# Patient Record
Sex: FEMALE | Race: WHITE | Employment: OTHER | URBAN - METROPOLITAN AREA
[De-identification: names, ages, dates, MRNs, and addresses within clinical notes are randomized per-mention and may not be internally consistent; named-entity substitution may affect disease eponyms.]

---

## 2017-04-19 ENCOUNTER — HOSPITAL ENCOUNTER (OUTPATIENT)
Dept: ULTRASOUND IMAGING | Age: 62
Discharge: HOME | End: 2017-04-19
Admitting: NURSE PRACTITIONER

## 2017-04-19 DIAGNOSIS — I85.00 ESOPHAGEAL VARICES WITHOUT MENTION OF BLEEDING: ICD-10-CM

## 2018-04-23 ENCOUNTER — HOSPITAL ENCOUNTER (OUTPATIENT)
Dept: ULTRASOUND IMAGING | Age: 63
Discharge: HOME | End: 2018-04-23
Admitting: INTERNAL MEDICINE

## 2018-04-23 DIAGNOSIS — K75.81 LIVER CIRRHOSIS SECONDARY TO NASH (HCC): ICD-10-CM

## 2018-04-23 DIAGNOSIS — K74.60 CIRRHOSIS OF LIVER WITHOUT ASCITES, UNSPECIFIED HEPATIC CIRRHOSIS TYPE (HCC): ICD-10-CM

## 2018-04-23 DIAGNOSIS — K74.60 LIVER CIRRHOSIS SECONDARY TO NASH (HCC): ICD-10-CM

## 2019-05-28 ENCOUNTER — HOSPITAL ENCOUNTER (OUTPATIENT)
Age: 64
Discharge: HOME | End: 2019-05-28
Payer: MEDICARE

## 2019-05-28 ENCOUNTER — HOSPITAL ENCOUNTER (OUTPATIENT)
Dept: CT IMAGING | Age: 64
Discharge: HOME | End: 2019-05-28
Payer: MEDICARE

## 2019-05-28 DIAGNOSIS — I67.1 CEREBRAL ANEURYSM, NONRUPTURED: ICD-10-CM

## 2019-05-28 LAB
BUN BLDV-MCNC: 17 MG/DL (ref 7–20)
CREAT SERPL-MCNC: 0.8 MG/DL (ref 0.6–1.2)
GFR AFRICAN AMERICAN: >60
GFR NON-AFRICAN AMERICAN: >60

## 2019-05-28 PROCEDURE — 6360000004 HC RX CONTRAST MEDICATION: Performed by: NEUROLOGICAL SURGERY

## 2019-05-28 PROCEDURE — 84520 ASSAY OF UREA NITROGEN: CPT

## 2019-05-28 PROCEDURE — 70496 CT ANGIOGRAPHY HEAD: CPT

## 2019-05-28 PROCEDURE — 70450 CT HEAD/BRAIN W/O DYE: CPT

## 2019-05-28 PROCEDURE — 82565 ASSAY OF CREATININE: CPT

## 2019-05-28 PROCEDURE — 36415 COLL VENOUS BLD VENIPUNCTURE: CPT

## 2019-05-28 RX ADMIN — IOPAMIDOL 75 ML: 755 INJECTION, SOLUTION INTRAVENOUS at 16:33

## 2019-05-29 ENCOUNTER — HOSPITAL ENCOUNTER (EMERGENCY)
Age: 64
Discharge: ANOTHER HOSPITAL | End: 2019-05-29
Attending: EMERGENCY MEDICINE
Payer: MEDICARE

## 2019-05-29 ENCOUNTER — HOSPITAL ENCOUNTER (INPATIENT)
Age: 64
LOS: 5 days | Discharge: HOME HEALTH | DRG: 159 | End: 2019-06-03
Attending: INTERNAL MEDICINE | Admitting: INTERNAL MEDICINE
Payer: MEDICARE

## 2019-05-29 ENCOUNTER — APPOINTMENT (OUTPATIENT)
Dept: CT IMAGING | Age: 64
End: 2019-05-29
Payer: MEDICARE

## 2019-05-29 ENCOUNTER — APPOINTMENT (OUTPATIENT)
Dept: GENERAL RADIOLOGY | Age: 64
End: 2019-05-29
Payer: MEDICARE

## 2019-05-29 VITALS
OXYGEN SATURATION: 100 % | WEIGHT: 183 LBS | TEMPERATURE: 97.8 F | HEART RATE: 74 BPM | SYSTOLIC BLOOD PRESSURE: 148 MMHG | DIASTOLIC BLOOD PRESSURE: 83 MMHG | RESPIRATION RATE: 17 BRPM | BODY MASS INDEX: 28.66 KG/M2

## 2019-05-29 DIAGNOSIS — K12.2 LUDWIG'S ANGINA: Primary | ICD-10-CM

## 2019-05-29 PROBLEM — R22.0 MOUTH SWELLING: Status: ACTIVE | Noted: 2019-05-29

## 2019-05-29 LAB
A/G RATIO: 1 (ref 1.1–2.2)
ALBUMIN SERPL-MCNC: 4.3 G/DL (ref 3.4–5)
ALP BLD-CCNC: 71 U/L (ref 40–129)
ALT SERPL-CCNC: 15 U/L (ref 10–40)
ANION GAP SERPL CALCULATED.3IONS-SCNC: 14 MMOL/L (ref 3–16)
AST SERPL-CCNC: 18 U/L (ref 15–37)
BASE EXCESS ARTERIAL: 1 (ref -3–3)
BASOPHILS ABSOLUTE: 0 K/UL (ref 0–0.2)
BASOPHILS RELATIVE PERCENT: 0.6 %
BILIRUB SERPL-MCNC: 0.3 MG/DL (ref 0–1)
BILIRUBIN URINE: NEGATIVE
BLOOD, URINE: NEGATIVE
BUN BLDV-MCNC: 21 MG/DL (ref 7–20)
CALCIUM SERPL-MCNC: 10.2 MG/DL (ref 8.3–10.6)
CHLORIDE BLD-SCNC: 103 MMOL/L (ref 99–110)
CLARITY: CLEAR
CO2: 24 MMOL/L (ref 21–32)
COLOR: YELLOW
CREAT SERPL-MCNC: 0.9 MG/DL (ref 0.6–1.2)
EOSINOPHILS ABSOLUTE: 0 K/UL (ref 0–0.6)
EOSINOPHILS RELATIVE PERCENT: 1 %
GFR AFRICAN AMERICAN: >60
GFR NON-AFRICAN AMERICAN: >60
GLOBULIN: 4.1 G/DL
GLUCOSE BLD-MCNC: 154 MG/DL (ref 70–99)
GLUCOSE URINE: NEGATIVE MG/DL
HCO3 ARTERIAL: 28.4 MMOL/L (ref 21–29)
HCT VFR BLD CALC: 34.7 % (ref 36–48)
HEMOGLOBIN: 11.4 G/DL (ref 12–16)
KETONES, URINE: NEGATIVE MG/DL
LEUKOCYTE ESTERASE, URINE: NEGATIVE
LYMPHOCYTES ABSOLUTE: 0.9 K/UL (ref 1–5.1)
LYMPHOCYTES RELATIVE PERCENT: 33.9 %
MCH RBC QN AUTO: 27.6 PG (ref 26–34)
MCHC RBC AUTO-ENTMCNC: 32.8 G/DL (ref 31–36)
MCV RBC AUTO: 84.2 FL (ref 80–100)
MICROSCOPIC EXAMINATION: NORMAL
MONO TEST: NEGATIVE
MONOCYTES ABSOLUTE: 0.2 K/UL (ref 0–1.3)
MONOCYTES RELATIVE PERCENT: 7.7 %
NEUTROPHILS ABSOLUTE: 1.5 K/UL (ref 1.7–7.7)
NEUTROPHILS RELATIVE PERCENT: 56.8 %
NITRITE, URINE: NEGATIVE
O2 SAT, ARTERIAL: 92 % (ref 93–100)
PCO2 ARTERIAL: 68 MM HG (ref 35–45)
PDW BLD-RTO: 15.3 % (ref 12.4–15.4)
PERFORMED ON: ABNORMAL
PH ARTERIAL: 7.23 (ref 7.35–7.45)
PH UA: 6 (ref 5–8)
PLATELET # BLD: 79 K/UL (ref 135–450)
PLATELET SLIDE REVIEW: ABNORMAL
PMV BLD AUTO: 7.4 FL (ref 5–10.5)
PO2 ARTERIAL: 78.8 MM HG (ref 75–108)
POC SAMPLE TYPE: ABNORMAL
POTASSIUM SERPL-SCNC: 4.1 MMOL/L (ref 3.5–5.1)
PROTEIN UA: NEGATIVE MG/DL
RBC # BLD: 4.12 M/UL (ref 4–5.2)
SEDIMENTATION RATE, ERYTHROCYTE: 94 MM/HR (ref 0–30)
SLIDE REVIEW: ABNORMAL
SODIUM BLD-SCNC: 141 MMOL/L (ref 136–145)
SPECIFIC GRAVITY UA: <=1.005 (ref 1–1.03)
TCO2 ARTERIAL: 31 MMOL/L
TOTAL PROTEIN: 8.4 G/DL (ref 6.4–8.2)
URINE REFLEX TO CULTURE: NORMAL
URINE TYPE: NORMAL
UROBILINOGEN, URINE: 0.2 E.U./DL
WBC # BLD: 2.7 K/UL (ref 4–11)

## 2019-05-29 PROCEDURE — 96368 THER/DIAG CONCURRENT INF: CPT

## 2019-05-29 PROCEDURE — 82803 BLOOD GASES ANY COMBINATION: CPT

## 2019-05-29 PROCEDURE — 96365 THER/PROPH/DIAG IV INF INIT: CPT

## 2019-05-29 PROCEDURE — 6360000002 HC RX W HCPCS: Performed by: STUDENT IN AN ORGANIZED HEALTH CARE EDUCATION/TRAINING PROGRAM

## 2019-05-29 PROCEDURE — 1200000000 HC SEMI PRIVATE

## 2019-05-29 PROCEDURE — 2580000003 HC RX 258: Performed by: EMERGENCY MEDICINE

## 2019-05-29 PROCEDURE — 94002 VENT MGMT INPAT INIT DAY: CPT

## 2019-05-29 PROCEDURE — 2500000003 HC RX 250 WO HCPCS: Performed by: EMERGENCY MEDICINE

## 2019-05-29 PROCEDURE — 2580000003 HC RX 258: Performed by: STUDENT IN AN ORGANIZED HEALTH CARE EDUCATION/TRAINING PROGRAM

## 2019-05-29 PROCEDURE — 6360000004 HC RX CONTRAST MEDICATION: Performed by: EMERGENCY MEDICINE

## 2019-05-29 PROCEDURE — 96372 THER/PROPH/DIAG INJ SC/IM: CPT

## 2019-05-29 PROCEDURE — 80053 COMPREHEN METABOLIC PANEL: CPT

## 2019-05-29 PROCEDURE — 85025 COMPLETE CBC W/AUTO DIFF WBC: CPT

## 2019-05-29 PROCEDURE — 86308 HETEROPHILE ANTIBODY SCREEN: CPT

## 2019-05-29 PROCEDURE — 86140 C-REACTIVE PROTEIN: CPT

## 2019-05-29 PROCEDURE — 70491 CT SOFT TISSUE NECK W/DYE: CPT

## 2019-05-29 PROCEDURE — 99285 EMERGENCY DEPT VISIT HI MDM: CPT

## 2019-05-29 PROCEDURE — 96361 HYDRATE IV INFUSION ADD-ON: CPT

## 2019-05-29 PROCEDURE — 96375 TX/PRO/DX INJ NEW DRUG ADDON: CPT

## 2019-05-29 PROCEDURE — 85652 RBC SED RATE AUTOMATED: CPT

## 2019-05-29 PROCEDURE — 2500000003 HC RX 250 WO HCPCS

## 2019-05-29 PROCEDURE — 6360000002 HC RX W HCPCS: Performed by: EMERGENCY MEDICINE

## 2019-05-29 PROCEDURE — 2000000000 HC ICU R&B

## 2019-05-29 PROCEDURE — 71045 X-RAY EXAM CHEST 1 VIEW: CPT

## 2019-05-29 PROCEDURE — 6360000002 HC RX W HCPCS

## 2019-05-29 PROCEDURE — 81003 URINALYSIS AUTO W/O SCOPE: CPT

## 2019-05-29 PROCEDURE — 96366 THER/PROPH/DIAG IV INF ADDON: CPT

## 2019-05-29 RX ORDER — METHYLPREDNISOLONE SODIUM SUCCINATE 125 MG/2ML
80 INJECTION, POWDER, LYOPHILIZED, FOR SOLUTION INTRAMUSCULAR; INTRAVENOUS DAILY
Status: DISCONTINUED | OUTPATIENT
Start: 2019-05-30 | End: 2019-06-03 | Stop reason: HOSPADM

## 2019-05-29 RX ORDER — DEXAMETHASONE SODIUM PHOSPHATE 10 MG/ML
4 INJECTION INTRAMUSCULAR; INTRAVENOUS ONCE
Status: COMPLETED | OUTPATIENT
Start: 2019-05-29 | End: 2019-05-29

## 2019-05-29 RX ORDER — PROPOFOL 10 MG/ML
10 INJECTION, EMULSION INTRAVENOUS CONTINUOUS
Status: DISCONTINUED | OUTPATIENT
Start: 2019-05-29 | End: 2019-05-31

## 2019-05-29 RX ORDER — VECURONIUM BROMIDE 1 MG/ML
10 INJECTION, POWDER, LYOPHILIZED, FOR SOLUTION INTRAVENOUS ONCE
Status: COMPLETED | OUTPATIENT
Start: 2019-05-29 | End: 2019-05-29

## 2019-05-29 RX ORDER — OMEPRAZOLE 20 MG/1
20 CAPSULE, DELAYED RELEASE ORAL DAILY
COMMUNITY

## 2019-05-29 RX ORDER — PROPOFOL 10 MG/ML
INJECTION, EMULSION INTRAVENOUS
Status: COMPLETED
Start: 2019-05-29 | End: 2019-05-29

## 2019-05-29 RX ORDER — 0.9 % SODIUM CHLORIDE 0.9 %
1000 INTRAVENOUS SOLUTION INTRAVENOUS ONCE
Status: COMPLETED | OUTPATIENT
Start: 2019-05-29 | End: 2019-05-29

## 2019-05-29 RX ORDER — PROPOFOL 10 MG/ML
10 INJECTION, EMULSION INTRAVENOUS
Status: DISCONTINUED | OUTPATIENT
Start: 2019-05-29 | End: 2019-05-29 | Stop reason: HOSPADM

## 2019-05-29 RX ORDER — ROCURONIUM BROMIDE 10 MG/ML
0.6 INJECTION, SOLUTION INTRAVENOUS ONCE
Status: DISCONTINUED | OUTPATIENT
Start: 2019-05-29 | End: 2019-05-29 | Stop reason: HOSPADM

## 2019-05-29 RX ORDER — CHLORHEXIDINE GLUCONATE 0.12 MG/ML
15 RINSE ORAL 2 TIMES DAILY
Status: DISCONTINUED | OUTPATIENT
Start: 2019-05-29 | End: 2019-05-31

## 2019-05-29 RX ORDER — PROPOFOL 10 MG/ML
60 INJECTION, EMULSION INTRAVENOUS ONCE
Status: COMPLETED | OUTPATIENT
Start: 2019-05-29 | End: 2019-05-29

## 2019-05-29 RX ORDER — ETOMIDATE 2 MG/ML
20 INJECTION INTRAVENOUS ONCE
Status: COMPLETED | OUTPATIENT
Start: 2019-05-29 | End: 2019-05-29

## 2019-05-29 RX ORDER — ETOMIDATE 2 MG/ML
0.3 INJECTION INTRAVENOUS ONCE
Status: COMPLETED | OUTPATIENT
Start: 2019-05-29 | End: 2019-05-29

## 2019-05-29 RX ORDER — SODIUM CHLORIDE 0.9 % (FLUSH) 0.9 %
10 SYRINGE (ML) INJECTION EVERY 12 HOURS SCHEDULED
Status: DISCONTINUED | OUTPATIENT
Start: 2019-05-29 | End: 2019-05-31

## 2019-05-29 RX ORDER — NICOTINE POLACRILEX 4 MG
15 LOZENGE BUCCAL PRN
Status: DISCONTINUED | OUTPATIENT
Start: 2019-05-29 | End: 2019-06-03 | Stop reason: HOSPADM

## 2019-05-29 RX ORDER — ACETAMINOPHEN 650 MG/1
650 SUPPOSITORY RECTAL EVERY 4 HOURS PRN
Status: DISCONTINUED | OUTPATIENT
Start: 2019-05-29 | End: 2019-06-03 | Stop reason: HOSPADM

## 2019-05-29 RX ORDER — GAUZE BANDAGE 4" X 4"
BANDAGE TOPICAL
COMMUNITY

## 2019-05-29 RX ORDER — DEXTROSE MONOHYDRATE 50 MG/ML
100 INJECTION, SOLUTION INTRAVENOUS PRN
Status: DISCONTINUED | OUTPATIENT
Start: 2019-05-29 | End: 2019-06-03 | Stop reason: HOSPADM

## 2019-05-29 RX ORDER — DEXTROSE MONOHYDRATE 25 G/50ML
12.5 INJECTION, SOLUTION INTRAVENOUS PRN
Status: DISCONTINUED | OUTPATIENT
Start: 2019-05-29 | End: 2019-06-03 | Stop reason: HOSPADM

## 2019-05-29 RX ORDER — DIPHENHYDRAMINE HYDROCHLORIDE 50 MG/ML
50 INJECTION INTRAMUSCULAR; INTRAVENOUS ONCE
Status: COMPLETED | OUTPATIENT
Start: 2019-05-29 | End: 2019-05-29

## 2019-05-29 RX ORDER — ONDANSETRON 2 MG/ML
4 INJECTION INTRAMUSCULAR; INTRAVENOUS EVERY 6 HOURS PRN
Status: DISCONTINUED | OUTPATIENT
Start: 2019-05-29 | End: 2019-06-03 | Stop reason: HOSPADM

## 2019-05-29 RX ORDER — SODIUM CHLORIDE 0.9 % (FLUSH) 0.9 %
10 SYRINGE (ML) INJECTION PRN
Status: DISCONTINUED | OUTPATIENT
Start: 2019-05-29 | End: 2019-05-31

## 2019-05-29 RX ADMIN — DEXAMETHASONE SODIUM PHOSPHATE 4 MG: 10 INJECTION, SOLUTION INTRAMUSCULAR; INTRAVENOUS at 15:12

## 2019-05-29 RX ADMIN — FENTANYL CITRATE 25 MCG/HR: 50 INJECTION, SOLUTION INTRAMUSCULAR; INTRAVENOUS at 23:52

## 2019-05-29 RX ADMIN — ETOMIDATE 25 MG: 2 INJECTION INTRAVENOUS at 17:19

## 2019-05-29 RX ADMIN — ETOMIDATE 20 MG: 2 INJECTION, SOLUTION INTRAVENOUS at 19:00

## 2019-05-29 RX ADMIN — FAMOTIDINE 20 MG: 10 INJECTION, SOLUTION INTRAVENOUS at 15:12

## 2019-05-29 RX ADMIN — AMPICILLIN SODIUM AND SULBACTAM SODIUM 3 G: 1; .5 INJECTION, POWDER, FOR SOLUTION INTRAMUSCULAR; INTRAVENOUS at 18:17

## 2019-05-29 RX ADMIN — DIPHENHYDRAMINE HYDROCHLORIDE 50 MG: 50 INJECTION, SOLUTION INTRAMUSCULAR; INTRAVENOUS at 16:03

## 2019-05-29 RX ADMIN — IOPAMIDOL 75 ML: 755 INJECTION, SOLUTION INTRAVENOUS at 16:17

## 2019-05-29 RX ADMIN — EPINEPHRINE 0.3 MG: 1 INJECTION INTRAMUSCULAR; INTRAVENOUS; SUBCUTANEOUS at 15:12

## 2019-05-29 RX ADMIN — SODIUM CHLORIDE 1000 ML: 9 INJECTION, SOLUTION INTRAVENOUS at 16:06

## 2019-05-29 RX ADMIN — PROPOFOL 60 MG: 10 INJECTION, EMULSION INTRAVENOUS at 18:50

## 2019-05-29 RX ADMIN — PROPOFOL 50 MCG/KG/MIN: 10 INJECTION, EMULSION INTRAVENOUS at 23:52

## 2019-05-29 RX ADMIN — VECURONIUM BROMIDE 10 MG: 5 INJECTION, POWDER, LYOPHILIZED, FOR SOLUTION INTRAVENOUS at 19:05

## 2019-05-29 RX ADMIN — PROPOFOL 10 MCG/KG/MIN: 10 INJECTION, EMULSION INTRAVENOUS at 17:59

## 2019-05-29 ASSESSMENT — PULMONARY FUNCTION TESTS
PIF_VALUE: 23
PIF_VALUE: 24
PIF_VALUE: 24
PIF_VALUE: 21
PIF_VALUE: 26
PIF_VALUE: 25
PIF_VALUE: 21
PIF_VALUE: 23

## 2019-05-29 NOTE — ED NOTES
Pt requesting to have her granddaughter Kalpana updated. Spoke with [facility] on the phone and gave update.      Torie Bazzi RN  05/29/19 9226

## 2019-05-29 NOTE — ED NOTES
Bed number: 0233-2  [facility]    Report number [phone]         Emy Stella  05/29/19 2200

## 2019-05-29 NOTE — ED NOTES
Pt. Medicated per mar and is aware of plan of care at this time. Pt. Provided w/oral suction to assist w/oral secretions. Remains on hr/rr monitoring w/bp cycling q10 mins. Will cont. To monitor.      Pedro Price RN  05/29/19 0010

## 2019-05-29 NOTE — ED NOTES
MD to bedside as pt. W/increased c/o \"feeling like my throat is closing\". MD ordered Malon Caryn and ct scan. Pt. Medicated per mar and taken to CT. Will cont. To monitor.      Nunu Ramon RN  05/29/19 0521

## 2019-05-29 NOTE — ED NOTES
Pt given 60 mg bolus propofol by Dr. Alanis Nathan, d/t pt fighting arm restraints. Mario Bateman, RN  05/29/19 1599

## 2019-05-29 NOTE — ED NOTES
Pt. Returned from CT. Provider to bedside for assessment. Pt. Replaced on hr/rr monitoring w/bp cycling q10 mins. Pt. Cont. To sx oral secretions and is alert and oriented at this time.      Ana Cheung RN  05/29/19 9931

## 2019-05-29 NOTE — ED NOTES
Pt. Received from triage stating she started a new nicotine patch yesterday and had a CT w/contrast for \"an anneurysm in my head\". States she noticed this morning that she had \"tongue swelling\" and was having difficulty swallowing her oral secretions. Pt. Brought to room 2 placed on hr/rr monitoring, attempting piv access at this time and MD Melendez to pt's bedside. MD declined need for ekg at this time. Will cont. To monitor.      Gonzalo Hope RN  05/29/19 6124

## 2019-05-29 NOTE — ED NOTES
MD notified pt. Requiring more sedation. Verbal orders given @ bedside for vecuronium and etomidate. Pt. Medicated and started 2nd piv d/t pt. Pulling piv to L hand. Propofol increased to 40mcg per md order @ bedside to achieve sufficient sedation. Will cont. To monitor.      Jaya Goncalves RN  05/29/19 6612

## 2019-05-29 NOTE — ED PROVIDER NOTES
Triage Chief Complaint:    Oral Swelling (Pt c/o tongue swelling that started about 2 hours ago. Pt reports that it is getting hard to breath)    [place]:  Sanam Clarke is a 58 y.o. female that presents to the emergency Department with complaints of having a swollen tongue. The patient states that this began approximately 2 hours prior to arrival.  The patient states that she had vomited, and sooner for vomiting, noticed a painful lump to the right side of her tongue. Patient states that that lump started to continue to swell, and the entire right side of her tongue is not swollen, and it is painful to move. Patient states she is having some difficulty swallowing. Patient is concerned about her airway. Patient states she is not on any ACE inhibitor medications. Patient has no allergies to any medications that she is aware of. Patient has not started any new medications. ROS:  At least 10 systems reviewed and otherwise acutely negative except as in the [address] Past Medical History:   Diagnosis Date    Arthritis     Diabetes     High blood pressure      Past Surgical History:   Procedure Laterality Date    KNEE SURGERY      left    LEG SURGERY      TUBAL LIGATION       No family history on file. Social History     Socioeconomic History    Marital status:       Spouse name: Not on file    Number of children: Not on file    Years of education: Not on file    Highest education level: Not on file   Occupational History    Not on file   Social Needs    Financial resource strain: Not on file    Food insecurity:     Worry: Not on file     Inability: Not on file    Transportation needs:     Medical: Not on file     Non-medical: Not on file   Tobacco Use    Smoking status: Never Smoker   Substance and Sexual Activity    Alcohol use: No    Drug use: No    Sexual activity: Not on file   Lifestyle    Physical activity:     Days per week: Not on file     Minutes per session: Not on file    Stress: Not on file   Relationships    Social connections:     Talks on phone: Not on file     Gets together: Not on file     Attends Hinduism service: Not on file     Active member of club or organization: Not on file     Attends meetings of clubs or organizations: Not on file     Relationship status: Not on file    Intimate partner violence:     Fear of current or ex partner: Not on file     Emotionally abused: Not on file     Physically abused: Not on file     Forced sexual activity: Not on file   Other Topics Concern    Not on file   Social History Narrative    Not on file     Current Facility-Administered Medications   Medication Dose Route Frequency Provider Last Rate Last Dose    rocuronium (ZEMURON) injection 50 mg  0.6 mg/kg Intravenous Once Janice Pratt MD        propofol 1000 MG/100ML injection             ampicillin-sulbactam (UNASYN) 3 g in sodium chloride 0.9 % 100 mL IVPB  3 g Intravenous Once Janice Pratt MD        propofol 1000 MG/100ML injection  10 mcg/kg/min Intravenous Titrated Janice Pratt MD         Current Outpatient Medications   Medication Sig Dispense Refill    omeprazole (PRILOSEC) 20 MG delayed release capsule Take 20 mg by mouth daily      IRON PO Take 65 mg by mouth      Cholecalciferol (VITAMIN D PO) Take 1.25 mg by mouth      Omega-3 Fatty Acids (FISH OIL) 435 MG CAPS Take by mouth      SITagliptin Phosphate (JANUVIA PO) Take 50 mg by mouth      buPROPion HCl (WELLBUTRIN PO) Take 300 mg by mouth      PARoxetine (PAXIL) 20 MG tablet       simvastatin (ZOCOR) 20 MG tablet       losartan-hydrochlorothiazide (HYZAAR) 100-25 MG per tablet       metformin (GLUCOPHAGE) 500 MG tablet 1,000 mg        Allergies   Allergen Reactions    Codeine     Morphine Nausea And Vomiting       Nursing Notes Reviewed    Physical Exam:  ED Triage Vitals   BP Temp Temp Source Pulse Resp SpO2 Height Weight   05/29/19 1517 05/29/19 1503 05/29/19 1503 05/29/19 1503 05/29/19 1503 05/29/19 1503 -- 05/29/19 1503   (!) 112/96 97.8 °F (36.6 °C) Temporal 83 17 95 %  183 lb (83 kg)     GENERAL APPEARANCE: Awake and alert. Cooperative. No acute distress. HEAD:Normocephalic. Atraumatic. EYES: EOM's grossly intact. Sclera anicteric. ENT: Mucous membranes are moist. Tolerates saliva. No trismus. Right sided lingular swelling. No uvular edema or throat swelling noted. No lip swelling. Normal dentition. No signs of Jluis's angina. NECK: Supple. No meningismus. Trachea midline. Mild fullness noted submandibularly on the right. No lymphadenopathy. HEART: RRR. LUNGS: Respirations unlabored. CTAB  ABDOMEN: Soft. Non-tender. No guarding or rebound. EXTREMITIES: No acute deformities. SKIN: Warm and dry. NEUROLOGICAL: No gross facial drooping. Moves all 4 extremities spontaneously.     Physical Exam    I have reviewed and interpreted all of the currently availablelab results from this visit (if applicable):  Results for orders placed or performed during the hospital encounter of 05/29/19   CBC Auto Differential   Result Value Ref Range    WBC 2.7 (L) 4.0 - 11.0 K/uL    RBC 4.12 4.00 - 5.20 M/uL    Hemoglobin 11.4 (L) 12.0 - 16.0 g/dL    Hematocrit 34.7 (L) 36.0 - 48.0 %    MCV 84.2 80.0 - 100.0 fL    MCH 27.6 26.0 - 34.0 pg    MCHC 32.8 31.0 - 36.0 g/dL    RDW 15.3 12.4 - 15.4 %    Platelets 79 (L) 581 - 450 K/uL    MPV 7.4 5.0 - 10.5 fL    PLATELET SLIDE REVIEW Decreased     SLIDE REVIEW see below     Neutrophils % 56.8 %    Lymphocytes % 33.9 %    Monocytes % 7.7 %    Eosinophils % 1.0 %    Basophils % 0.6 %    Neutrophils # 1.5 (L) 1.7 - 7.7 K/uL    Lymphocytes # 0.9 (L) 1.0 - 5.1 K/uL    Monocytes # 0.2 0.0 - 1.3 K/uL    Eosinophils # 0.0 0.0 - 0.6 K/uL    Basophils # 0.0 0.0 - 0.2 K/uL   Comprehensive Metabolic Panel   Result Value Ref Range    Sodium 141 136 - 145 mmol/L    Potassium 4.1 3.5 - 5.1 mmol/L    Chloride 103 99 - 110 mmol/L    CO2 24 21 - 32 mmol/L    Anion Gap 14 3 - 16 Glucose 154 (H) 70 - 99 mg/dL    BUN 21 (H) 7 - 20 mg/dL    CREATININE 0.9 0.6 - 1.2 mg/dL    GFR Non-African American >60 >60    GFR African American >60 >60    Calcium 10.2 8.3 - 10.6 mg/dL    Total Protein 8.4 (H) 6.4 - 8.2 g/dL    Alb 4.3 3.4 - 5.0 g/dL    Albumin/Globulin Ratio 1.0 (L) 1.1 - 2.2    Total Bilirubin 0.3 0.0 - 1.0 mg/dL    Alkaline Phosphatase 71 40 - 129 U/L    ALT 15 10 - 40 U/L    AST 18 15 - 37 U/L    Globulin 4.1 g/dL   Mononucleosis screen   Result Value Ref Range    Mono Test Negative Negative   Sedimentation Rate   Result Value Ref Range    Sed Rate 94 (H) 0 - 30 mm/Hr   Urinalysis Reflex to Culture   Result Value Ref Range    Color, UA Yellow Straw/Yellow    Clarity, UA Clear Clear    Glucose, Ur Negative Negative mg/dL    Bilirubin Urine Negative Negative    Ketones, Urine Negative Negative mg/dL    Specific Gravity, UA <=1.005 1.005 - 1.030    Blood, Urine Negative Negative    pH, UA 6.0 5.0 - 8.0    Protein, UA Negative Negative mg/dL    Urobilinogen, Urine 0.2 <2.0 E.U./dL    Nitrite, Urine Negative Negative    Leukocyte Esterase, Urine Negative Negative    Microscopic Examination Not Indicated     Urine Reflex to Culture Not Indicated     Urine Type Not Specified         Radiographs (if obtained):  [] The following radiograph was interpreted by myself in the absence of a radiologist:  [x] Radiologist's Report Reviewed:  CT SOFT TISSUE NECK W CONTRAST   Final Result   Addendum 1 of 1   ADDENDUM:   Critical results were called by Dr. Kenya Swift MD to [facility]   on 5/29/2019 at 16:52.          Final      XR CHEST PORTABLE    (Results Pending)         Intubation  Date/Time: 5/29/2019 5:40 PM  Performed by: Champ Land MD  Authorized by: Champ Land MD     Consent:     Consent obtained:  Verbal    Consent given by:  Patient    Risks discussed:  Aspiration, brain injury and death    Alternatives discussed:  Delayed treatment  Pre-procedure details: Patient status:  Awake    Mallampati score:  III    Pretreatment meds: etomidate. Paralytics:  Rocuronium  Procedure details:     Preoxygenation:  Bag valve mask    CPR in progress: no      Intubation method:  Oral    Oral intubation technique:  Fiber optic    Tube size (mm):  7.0    Tube type:  Cuffed    Number of attempts:  2    Ventilation between attempts: yes      Cricoid pressure: yes      Tube visualized through cords: yes    Placement assessment:     Tube secured with:  ETT hardy    Breath sounds:  Equal and absent over the epigastrium    Placement verification: chest rise, CXR verification, ETCO2 detector and tube exhalation      CXR findings:  ETT in proper place  Post-procedure details:     Patient tolerance of procedure: Tolerated well, no immediate complications  Comments:      After intubation was completed, I did give the patient an IV dose of the milligrams of propofol in order to help with sedation, and then she was started on a drip afterwards. MDM:  After my initial evaluation, I do feel the patient was likely suffering from some sort of allergic reaction. However the patient may have suffered an injury that caused her to have her tongue swell. The patient is on no new medications, and especially is not on any blood pressure medications that could cause problems. Patient was given Decadron, Pepcid, and subcutaneous epinephrine. The patient had continued swelling of the tongue, and then started to extend down to the right side of the patient's neck. The patient never became stridorous, but I did send the patient for CT scan of her neck and soft tissues which revealed significant submandibular and submental swelling, consistent with possible Jluis's angina. At this point I did feel that the patient was going to require intubation, especially when she came back from CT with submandibular swelling now on the left as well as the right.   The patient still was not stridorous and was able to speak. I did explain to the patient that I do feel that we need to protect her airway and she did consent to having intubation done. I did contact ENT at [facility], and spoke with Brisa Kline, who agreed that the patient required intubation and transfer to their ICU for observation. We did start Unasyn IV. Patient was intubated as outlined above. I spoke with the intensivist, Dr. Juliana Montes, and he agreed with admission. I then spoke with Dr. Yazan Robledo, and he also agreed with admission, and plans were made to arrange transport to [facility]. Critical care time provided of 33 minutes, excluding any previous dictated procedures. This time is being requested for physical examination, medical intervention, laboratory interpretation, frequent reassessments, physician consultation, and charting. Clinical Impression:  1.  Jluis's angina      (Please note that portions of this note Marshall Darline been completed with a voice recognition program. Efforts were made to edit the dictations but occasionally words are mis-transcribed.)    MD Janice Wayne MD  05/29/19 9622

## 2019-05-29 NOTE — PROGRESS NOTES
05/29/19 1747   Vent Information   Vent Type 840   Vent Mode AC/VC   Vt Ordered 400 mL   Rate Set 16 bmp   Peak Flow 60 L/min   Pressure Support 0 cmH20   FiO2  50 %   Sensitivity 3   PEEP/CPAP 5   Humidification Source Heated wire   Vent Patient Data   Peak Inspiratory Pressure 26 cmH2O   Mean Airway Pressure 8.4 cmH20   Rate Measured 16 br/min   Vt Exhaled 379 mL   Minute Volume 6.05 Liters   I:E Ratio 1:4.20   Spontaneous Breathing Trial (SBT) RT Doc   Pulse 80   SpO2 100 %   Additional Respiratory  Assessments   Resp (!) 0   Alarm Settings   High Pressure Alarm 40 cmH2O   Low Minute Volume Alarm 2.5 L/min   High Respiratory Rate 40 br/min   Patient Observation   Observations #7.0 ETT @ 25 lip line

## 2019-05-29 NOTE — ED NOTES
Verified with lab that adequate blood was in lab for add on specimens       Winter Coronel RN  05/29/19 3369

## 2019-05-30 PROBLEM — L08.9 NECK INFECTION: Status: ACTIVE | Noted: 2019-05-30

## 2019-05-30 LAB
ANION GAP SERPL CALCULATED.3IONS-SCNC: 13 MMOL/L (ref 3–16)
BASE EXCESS ARTERIAL: 1 (ref -3–3)
BASE EXCESS ARTERIAL: 2 (ref -3–3)
BASE EXCESS ARTERIAL: 2 (ref -3–3)
BASOPHILS ABSOLUTE: 0 K/UL (ref 0–0.2)
BASOPHILS RELATIVE PERCENT: 0.5 %
BUN BLDV-MCNC: 20 MG/DL (ref 7–20)
C-REACTIVE PROTEIN: 4.9 MG/L (ref 0–5.1)
C-REACTIVE PROTEIN: 6.1 MG/L (ref 0–5.1)
CALCIUM SERPL-MCNC: 9.5 MG/DL (ref 8.3–10.6)
CHLORIDE BLD-SCNC: 103 MMOL/L (ref 99–110)
CO2: 25 MMOL/L (ref 21–32)
CREAT SERPL-MCNC: 0.7 MG/DL (ref 0.6–1.2)
EOSINOPHILS ABSOLUTE: 0 K/UL (ref 0–0.6)
EOSINOPHILS RELATIVE PERCENT: 0.2 %
GFR AFRICAN AMERICAN: >60
GFR NON-AFRICAN AMERICAN: >60
GLUCOSE BLD-MCNC: 108 MG/DL (ref 70–99)
GLUCOSE BLD-MCNC: 111 MG/DL (ref 70–99)
GLUCOSE BLD-MCNC: 113 MG/DL (ref 70–99)
GLUCOSE BLD-MCNC: 154 MG/DL (ref 70–99)
GLUCOSE BLD-MCNC: 163 MG/DL (ref 70–99)
GLUCOSE BLD-MCNC: 206 MG/DL (ref 70–99)
GLUCOSE BLD-MCNC: 252 MG/DL (ref 70–99)
HCO3 ARTERIAL: 25.9 MMOL/L (ref 21–29)
HCO3 ARTERIAL: 26.5 MMOL/L (ref 21–29)
HCO3 ARTERIAL: 26.6 MMOL/L (ref 21–29)
HCT VFR BLD CALC: 32.7 % (ref 36–48)
HEMOGLOBIN: 10.5 G/DL (ref 12–16)
LYMPHOCYTES ABSOLUTE: 0.7 K/UL (ref 1–5.1)
LYMPHOCYTES RELATIVE PERCENT: 21.7 %
MCH RBC QN AUTO: 26.5 PG (ref 26–34)
MCHC RBC AUTO-ENTMCNC: 32.2 G/DL (ref 31–36)
MCV RBC AUTO: 82.3 FL (ref 80–100)
MONOCYTES ABSOLUTE: 0.4 K/UL (ref 0–1.3)
MONOCYTES RELATIVE PERCENT: 10.5 %
NEUTROPHILS ABSOLUTE: 2.3 K/UL (ref 1.7–7.7)
NEUTROPHILS RELATIVE PERCENT: 67.1 %
O2 SAT, ARTERIAL: 92 % (ref 93–100)
O2 SAT, ARTERIAL: 97 % (ref 93–100)
O2 SAT, ARTERIAL: 99 % (ref 93–100)
PCO2 ARTERIAL: 36 MM HG (ref 35–45)
PCO2 ARTERIAL: 42.8 MM HG (ref 35–45)
PCO2 ARTERIAL: 46.4 MM HG (ref 35–45)
PDW BLD-RTO: 15.7 % (ref 12.4–15.4)
PERFORMED ON: ABNORMAL
PH ARTERIAL: 7.37 (ref 7.35–7.45)
PH ARTERIAL: 7.4 (ref 7.35–7.45)
PH ARTERIAL: 7.46 (ref 7.35–7.45)
PLATELET # BLD: 88 K/UL (ref 135–450)
PMV BLD AUTO: 7.6 FL (ref 5–10.5)
PO2 ARTERIAL: 119.6 MM HG (ref 75–108)
PO2 ARTERIAL: 67 MM HG (ref 75–108)
PO2 ARTERIAL: 86.9 MM HG (ref 75–108)
POC SAMPLE TYPE: ABNORMAL
POTASSIUM REFLEX MAGNESIUM: 4.3 MMOL/L (ref 3.5–5.1)
RBC # BLD: 3.97 M/UL (ref 4–5.2)
SEDIMENTATION RATE, ERYTHROCYTE: 92 MM/HR (ref 0–30)
SODIUM BLD-SCNC: 141 MMOL/L (ref 136–145)
TCO2 ARTERIAL: 27 MMOL/L
TCO2 ARTERIAL: 28 MMOL/L
TCO2 ARTERIAL: 28 MMOL/L
WBC # BLD: 3.4 K/UL (ref 4–11)

## 2019-05-30 PROCEDURE — 6360000002 HC RX W HCPCS: Performed by: STUDENT IN AN ORGANIZED HEALTH CARE EDUCATION/TRAINING PROGRAM

## 2019-05-30 PROCEDURE — 85652 RBC SED RATE AUTOMATED: CPT

## 2019-05-30 PROCEDURE — 99223 1ST HOSP IP/OBS HIGH 75: CPT | Performed by: INTERNAL MEDICINE

## 2019-05-30 PROCEDURE — 2700000000 HC OXYGEN THERAPY PER DAY

## 2019-05-30 PROCEDURE — 86140 C-REACTIVE PROTEIN: CPT

## 2019-05-30 PROCEDURE — 99291 CRITICAL CARE FIRST HOUR: CPT | Performed by: INTERNAL MEDICINE

## 2019-05-30 PROCEDURE — 2580000003 HC RX 258

## 2019-05-30 PROCEDURE — 94770 HC ETCO2 MONITOR DAILY: CPT

## 2019-05-30 PROCEDURE — 82947 ASSAY GLUCOSE BLOOD QUANT: CPT

## 2019-05-30 PROCEDURE — 6370000000 HC RX 637 (ALT 250 FOR IP): Performed by: STUDENT IN AN ORGANIZED HEALTH CARE EDUCATION/TRAINING PROGRAM

## 2019-05-30 PROCEDURE — 2000000000 HC ICU R&B

## 2019-05-30 PROCEDURE — 87040 BLOOD CULTURE FOR BACTERIA: CPT

## 2019-05-30 PROCEDURE — 82803 BLOOD GASES ANY COMBINATION: CPT

## 2019-05-30 PROCEDURE — 94761 N-INVAS EAR/PLS OXIMETRY MLT: CPT

## 2019-05-30 PROCEDURE — 94750 HC PULMONARY COMPLIANCE STUDY: CPT

## 2019-05-30 PROCEDURE — 85025 COMPLETE CBC W/AUTO DIFF WBC: CPT

## 2019-05-30 PROCEDURE — 94003 VENT MGMT INPAT SUBQ DAY: CPT

## 2019-05-30 PROCEDURE — 36415 COLL VENOUS BLD VENIPUNCTURE: CPT

## 2019-05-30 PROCEDURE — 99221 1ST HOSP IP/OBS SF/LOW 40: CPT | Performed by: OTOLARYNGOLOGY

## 2019-05-30 PROCEDURE — 2580000003 HC RX 258: Performed by: STUDENT IN AN ORGANIZED HEALTH CARE EDUCATION/TRAINING PROGRAM

## 2019-05-30 PROCEDURE — 80048 BASIC METABOLIC PNL TOTAL CA: CPT

## 2019-05-30 RX ORDER — SIMVASTATIN 10 MG
10 TABLET ORAL NIGHTLY
Status: DISCONTINUED | OUTPATIENT
Start: 2019-05-30 | End: 2019-06-03 | Stop reason: HOSPADM

## 2019-05-30 RX ORDER — OMEPRAZOLE 20 MG/1
20 CAPSULE, DELAYED RELEASE ORAL DAILY
Status: DISCONTINUED | OUTPATIENT
Start: 2019-05-30 | End: 2019-06-03 | Stop reason: HOSPADM

## 2019-05-30 RX ORDER — PAROXETINE HYDROCHLORIDE 20 MG/1
30 TABLET, FILM COATED ORAL DAILY
Status: DISCONTINUED | OUTPATIENT
Start: 2019-05-30 | End: 2019-06-03 | Stop reason: HOSPADM

## 2019-05-30 RX ORDER — BUPROPION HYDROCHLORIDE 75 MG/1
300 TABLET ORAL DAILY
Status: DISCONTINUED | OUTPATIENT
Start: 2019-05-30 | End: 2019-06-03 | Stop reason: HOSPADM

## 2019-05-30 RX ORDER — SODIUM CHLORIDE 9 MG/ML
INJECTION, SOLUTION INTRAVENOUS
Status: COMPLETED
Start: 2019-05-30 | End: 2019-05-30

## 2019-05-30 RX ADMIN — PROPOFOL 45 MCG/KG/MIN: 10 INJECTION, EMULSION INTRAVENOUS at 08:42

## 2019-05-30 RX ADMIN — SODIUM CHLORIDE: 9 INJECTION, SOLUTION INTRAVENOUS at 03:11

## 2019-05-30 RX ADMIN — Medication 15 ML: at 00:34

## 2019-05-30 RX ADMIN — AMPICILLIN SODIUM AND SULBACTAM SODIUM 3 G: 2; 1 INJECTION, POWDER, FOR SOLUTION INTRAMUSCULAR; INTRAVENOUS at 17:43

## 2019-05-30 RX ADMIN — AMPICILLIN SODIUM AND SULBACTAM SODIUM 3 G: 2; 1 INJECTION, POWDER, FOR SOLUTION INTRAMUSCULAR; INTRAVENOUS at 06:13

## 2019-05-30 RX ADMIN — INSULIN LISPRO 1 UNITS: 100 INJECTION, SOLUTION INTRAVENOUS; SUBCUTANEOUS at 00:32

## 2019-05-30 RX ADMIN — AMPICILLIN SODIUM AND SULBACTAM SODIUM 3 G: 2; 1 INJECTION, POWDER, FOR SOLUTION INTRAMUSCULAR; INTRAVENOUS at 00:34

## 2019-05-30 RX ADMIN — SIMVASTATIN 10 MG: 10 TABLET, FILM COATED ORAL at 20:08

## 2019-05-30 RX ADMIN — Medication 10 ML: at 08:35

## 2019-05-30 RX ADMIN — LINAGLIPTIN 5 MG: 5 TABLET, FILM COATED ORAL at 16:59

## 2019-05-30 RX ADMIN — METHYLPREDNISOLONE SODIUM SUCCINATE 80 MG: 125 INJECTION, POWDER, FOR SOLUTION INTRAMUSCULAR; INTRAVENOUS at 08:34

## 2019-05-30 RX ADMIN — ENOXAPARIN SODIUM 40 MG: 40 INJECTION SUBCUTANEOUS at 08:35

## 2019-05-30 RX ADMIN — Medication 10 ML: at 00:34

## 2019-05-30 RX ADMIN — AMPICILLIN SODIUM AND SULBACTAM SODIUM 3 G: 2; 1 INJECTION, POWDER, FOR SOLUTION INTRAMUSCULAR; INTRAVENOUS at 23:40

## 2019-05-30 RX ADMIN — OMEPRAZOLE 20 MG: 20 CAPSULE, DELAYED RELEASE ORAL at 16:58

## 2019-05-30 RX ADMIN — PROPOFOL 45 MCG/KG/MIN: 10 INJECTION, EMULSION INTRAVENOUS at 04:05

## 2019-05-30 RX ADMIN — AMPICILLIN SODIUM AND SULBACTAM SODIUM 3 G: 2; 1 INJECTION, POWDER, FOR SOLUTION INTRAMUSCULAR; INTRAVENOUS at 12:33

## 2019-05-30 RX ADMIN — PAROXETINE HYDROCHLORIDE 30 MG: 20 TABLET, FILM COATED ORAL at 17:00

## 2019-05-30 RX ADMIN — Medication 10 ML: at 20:13

## 2019-05-30 RX ADMIN — BUPROPION HYDROCHLORIDE 300 MG: 75 TABLET, FILM COATED ORAL at 17:00

## 2019-05-30 RX ADMIN — Medication 15 ML: at 08:18

## 2019-05-30 RX ADMIN — INSULIN LISPRO 1 UNITS: 100 INJECTION, SOLUTION INTRAVENOUS; SUBCUTANEOUS at 20:10

## 2019-05-30 RX ADMIN — INSULIN LISPRO 3 UNITS: 100 INJECTION, SOLUTION INTRAVENOUS; SUBCUTANEOUS at 18:08

## 2019-05-30 RX ADMIN — INSULIN LISPRO 1 UNITS: 100 INJECTION, SOLUTION INTRAVENOUS; SUBCUTANEOUS at 12:36

## 2019-05-30 RX ADMIN — Medication 15 ML: at 20:08

## 2019-05-30 ASSESSMENT — PULMONARY FUNCTION TESTS
PIF_VALUE: 22
PIF_VALUE: 22
PIF_VALUE: 23
PIF_VALUE: 21
PIF_VALUE: 13
PIF_VALUE: 21
PIF_VALUE: 18
PIF_VALUE: 18
PIF_VALUE: 20
PIF_VALUE: 23
PIF_VALUE: 22
PIF_VALUE: 21
PIF_VALUE: 22
PIF_VALUE: 20
PIF_VALUE: 20
PIF_VALUE: 21
PIF_VALUE: 24
PIF_VALUE: 18
PIF_VALUE: 20
PIF_VALUE: 18
PIF_VALUE: 23
PIF_VALUE: 20

## 2019-05-30 ASSESSMENT — PAIN SCALES - GENERAL
PAINLEVEL_OUTOF10: 0

## 2019-05-30 NOTE — PROGRESS NOTES
Admission ABG as follows:   Ref. Range 5/29/2019 22:18   pH, Arterial Latest Ref Range: 7.350 - 7.450  7.229 (L)   pCO2, Arterial Latest Ref Range: 35.0 - 45.0 mm Hg 68.0 (H)   pO2, Arterial Latest Ref Range: 75.0 - 108.0 mm Hg 78.8   HCO3, Arterial Latest Ref Range: 21.0 - 29.0 mmol/L 28.4   TCO2 (calc), Art Latest Ref Range: Not Established mmol/L 31   Base Excess, Arterial Latest Ref Range: -3 - 3  1   O2 Sat, Arterial Latest Ref Range: 93 - 100 % 92 (L)   Sample Type Unknown ART   Patients respiratory rate increased from 16 to 20 per MD orders at this time. Will draw a follow up ABG in an hour. Will continue to monitor.

## 2019-05-30 NOTE — PROGRESS NOTES
Patient has been successfully weaned from Mechanical Ventilation. RSBI before extubation was 40 with EtCO2 of 32 and SpO2 of 99 on 40% FiO2. Patient extubated and placed on 4 liters/min via nasal cannula. Post extubation SpO2 is 96% with HR  84 bpm and RR 19 breaths/min. Patient had strong cough that was non-productive. Extubation Well tolerated by patient. Leticia Penny

## 2019-05-30 NOTE — CONSULTS
ICU Progress Note        PGY1    Hospital Day: 2                                                         Code:Full Code  Admit Date: 5/29/2019                                 PCP: MARSHA Ramsey CNP    ICU Day: 2  Vent Day:1   Antibiotics: Unasyn Indication: Sea Angina  Duration: 1    Diet: DIET CARB CONTROL;    Daily Plan:  5/30/2019     HPI:    59 yo F with PMHx NIDDM, HTN, and R MCA saccular aneurysm who was experiencing maxillary facial pain in March which was later attributed to tooth abscess which was treated with root canal of L cuspid tooth by a dentist in [facility] in April (records not available in [address] [place] [address], pt will attempt to find her dentist's number as she does not recall exact date and details of procedure). Yesterday she experienced sudden-onset of R-sided tongue swelling, following by an acute episode of violent vomiting at her grand-daughter's house. She ate some nuts and grapes prior to episode, of which she is unaware of any allergy. Last change in medication was Hyzaar to candesartan in March due to Hyzaar recall. She denies any recent sore throat, dysphagia, cough, sick contacts or allergic reaction. She denies any fevers, chills night sweats, nausea or vomiting at present. She has been off antibiotics for about a month. She went for a CTA at [facility] on day prior to episode for follow-up of her berry aneurysm. She drove herself to [facility] yesterday where CT soft tissue neck identified right submandibular periglandular fluid with extension into the submental and sublingual compartments, consistent with Jluis's angina. She was started on Unasyn and steroids, intubated for airway protection and transferred to [facility] for ENT evaluation. Suspect source of submandibular infection is thought to be recent tooth abscess. She was extubated today 5/30 with no acute complications.      Subjective:     Pt was phonating during examination this morning while intubated. Passed leak test and was successfully extubated this afternoon. She endorses minimal throat pain post extubation and reports significant decrease in submandibular swelling. She denies any current pain and reports good appetite. She still has some R-sided submandibular swelling.      Medications:     Scheduled Meds:   buPROPion  300 mg Oral Daily    omeprazole  20 mg Oral Daily    PARoxetine  30 mg Oral Daily    simvastatin  10 mg Oral Nightly    linagliptin  5 mg Oral Daily    sodium chloride flush  10 mL Intravenous 2 times per day    enoxaparin  40 mg Subcutaneous Daily    chlorhexidine  15 mL Mouth/Throat BID    ampicillin-sulbactam  3 g Intravenous Q6H    insulin lispro  0-6 Units Subcutaneous TID WC    insulin lispro  0-3 Units Subcutaneous Nightly    methylPREDNISolone  80 mg Intravenous Daily     Continuous Infusions:   fentaNYL 2000 mcg/200 mL IV infusion Stopped (19 1210)    propofol Stopped (19 1132)    dextrose       PRN Meds:benzocaine-menthol, sodium chloride flush, magnesium hydroxide, ondansetron, acetaminophen, glucose, dextrose, glucagon (rDNA), dextrose    Objective:   Vitals  T-max: Temp (24hrs), Av °F (36.7 °C), Min:97.7 °F (36.5 °C), Max:98.4 °F (36.9 °C)    Patient Vitals for the past 8 hrs:   BP Temp Temp src Pulse Resp SpO2   19 1400 -- -- -- 82 14 96 %   19 1345 -- -- -- 82 16 97 %   19 1315 -- -- -- 83 16 94 %   19 1300 121/66 -- -- 80 15 94 %   19 1245 -- -- -- 85 14 95 %   19 1230 122/61 -- -- 82 16 97 %   19 1215 -- -- -- 94 21 100 %   19 1200 131/65 -- -- 86 25 97 %   19 1145 -- -- -- 80 16 100 %   19 1131 -- -- -- 72 12 99 %   19 1130 123/63 -- -- 77 17 96 %   19 1122 -- -- -- 72 21 100 %   19 1115 -- -- -- 73 18 99 %   19 1100 (!) 89/48 -- -- 68 17 98 %   19 1045 -- -- -- 69 17 98 %   19 1030 (!) 90/51 -- -- 69 17 99 %   05/30/19 1015 -- -- -- 68 17 99 %   05/30/19 1000 (!) 102/56 -- -- 70 17 100 %   05/30/19 0945 -- -- -- 71 17 99 %   05/30/19 0930 (!) 109/55 -- -- 72 17 100 %   05/30/19 0915 -- -- -- 75 17 99 %   05/30/19 0900 109/61 -- -- 75 17 99 %   05/30/19 0845 -- -- -- 77 18 99 %   05/30/19 0838 126/70 98.4 °F (36.9 °C) Axillary -- -- --   05/30/19 0831 126/70 -- -- 78 17 100 %   05/30/19 0830 -- -- -- 73 14 100 %   05/30/19 0815 -- -- -- 74 17 100 %   05/30/19 0811 -- -- -- 75 17 100 %   05/30/19 0801 112/62 -- -- 74 17 100 %   05/30/19 0800 -- -- -- 75 17 100 %   05/30/19 0745 -- -- -- 73 17 100 %   05/30/19 0730 113/63 -- -- 74 17 100 %   05/30/19 0715 -- -- -- 74 17 99 %   05/30/19 0700 101/60 -- -- 74 17 99 %       Intake/Output Summary (Last 24 hours) at 5/30/2019 1441  Last data filed at 5/30/2019 1321  Gross per 24 hour   Intake 377.69 ml   Output 1175 ml   Net -797.31 ml       Physical Examination:   · General appearance: Appears comfortable at rest, fully alert and orientated    · HEENT: Atraumatic, moist mucus membranes, obese neck, Swollen, non-tender R submandibular gland, no palpable crepitus of soft neck tissues, no trismus. Normal gingiva, no tenderness to palpation of sinuses, or gingival, no tonsillar exudate. No pain upon rotation of neck or with swallowing  · Respiratory: Normal respiratory effort. No wheezes, rubs, or crackles  · Cardiovascular: regular S1/S2, with no Murmer, rub or gallop. No JVD  · Abdomen: Soft, non-tender, non-distended  · Musculoskeletal: No clubbing, cyanosis, no lower extremity edema, peripheral pulses present, cap refill < 2sec  · Neurologic: Neurovascularly grossly intact without any focal motor deficits.  Cranial nerves:  grossly non-focal.    LABS    CBC:   Recent Labs     05/29/19  1513 05/30/19  0602   WBC 2.7* 3.4*   HGB 11.4* 10.5*   HCT 34.7* 32.7*   PLT 79* 88*   MCV 84.2 82.3     Renal:   Recent Labs     05/28/19  1535 05/29/19  1513 05/30/19  0602   NA  --  141 141   K  --  4.1 4.3   CL  --  103 103   CO2  --  24 25   BUN 17 21* 20   CREATININE 0.8 0.9 0.7   GLUCOSE  --  154* 113*   CALCIUM  --  10.2 9.5   ANIONGAP  --  14 13     Hepatic:   Recent Labs     05/29/19  1513   AST 18   ALT 15   BILITOT 0.3   PROT 8.4*   LABALBU 4.3   ALKPHOS 71     Troponin: No results for input(s): TROPONINI in the last 72 hours. BNP: No results for input(s): BNP in the last 72 hours. Lipids: No results for input(s): CHOL, HDL in the last 72 hours. Invalid input(s): LDLCALCU, TRIGLYCERIDE  ABGs:    Recent Labs     05/30/19  0017 05/30/19  0400 05/30/19  0610   PHART 7.400 7.465* 7.367   CZO0ICA 42.8 36.0 46.4*   PO2ART 119.6* 86.9 67.0*   RHC0TOS 26.5 25.9 26.6   BEART 2 2 1   G8RDYRWW 99 97 92*   YIF5CMY 28 27 28       INR: No results for input(s): INR in the last 72 hours. Lactate: No results for input(s): LACTATE in the last 72 hours. Cultures:  Blood cultures pending  -----------------------------------------------------------------  Imaging:  CT soft tissue neck (5/29): Right submandibular periglandular fluid, also interspersed in the submental   in sublingual compartments.  Jluis's angina/deep cellulitis is a primary   concern.  Submandibular sialoadenitis is another consideration.              Assessment/Plan:   Scout Mittal is a 58 y.o. female with PMH of DMII and HTN transferred from [facility] for ENT evaluation for Jluis's angina.     Right submandibular space swelling 2/2 suspected Jluis's angina (periodontal infection as suspected source)   -patient reported she had root canal of L upper cuspid for tooth abscess one month ago (records not available in [place], pt will attempt to find records from dentist)  -extubated 5/30  -CT scan of the head and neck showing right submandibular periglandular fluid with extension into the submental and sublingual compartments, consistent wth infectious and/or allergic etiology   -appreciate ENT recs   -Ampicillin-sulbactam 3g IV q6h, continue abx therapy for 2-3 weeks, appreciate ID recs  -Methylprednisolone 80 mg q daily, resumed home omeprazole  -CRP not elevated, afebrile, less concerning for systemic infection   -Blood cultures x 2 pending   -peridex mouthwash  -appreciate ID recs      DM2   -resume home Januvia  -resume metformin 1000 mg PO BID starting tomorrow (awaiting 48hrs post contrast)   -Low-dose sliding scale insulin   -Hypoglycemia protocol      HTN   -resume home candesartan 4 mh PO qD tomorrow as BP allows (was switched from Hyzaar to candesartan in March due to Hyzaar recall      HLD  -resume home simvastatin 20 mg while NPO     Anxiety  -resume home bupropion, paroxetine    R MCA saccular aneurysm   -last CTA head performed day prior to admission (5/28), R MCA bifurcation aneurysm stable at 5mm    -follows with Dr. Sarahi Lopez, neurology     Code Status:Full Code  FEN: Carb control diet  PPX: Lovenox 40  DISPO: ICU     W/D/W/A  -----------------------------  Jaya Carl, PGY1  3/46/5158  2:41 PM     Patient seen, examined and discussed with the resident and I agree with the assessment and plan. Briefly, this is a 58 y.o. female with respiratory failure 2/2 Jluis's angina    Decreased swelling on exam per report and ENT evaluated her and felt her airway was safe enough to remove from the vent. Allowed patient to wake up and performed a cuff leak test with adequate audible air escape. Patient extubated successfully this morning and she said the swelling in her tongue was improved and that she could speak again which was not the case when she was intubated. Continue unasyn. ENT may take a second look now that the ETT has been removed. Critical care time spent reviewing labs/films, examining patient, collaborating with other physicians but excluding procedures for life threatening organ failure is 35 minutes.       Norris Cannon MD

## 2019-05-30 NOTE — CONSULTS
Substance and Sexual Activity   Alcohol Use No       Current Facility-Administered Medications:     sodium chloride flush 0.9 % injection 10 mL, 10 mL, Intravenous, 2 times per day, Jaya Williamson MD, 10 mL at 05/30/19 0835    sodium chloride flush 0.9 % injection 10 mL, 10 mL, Intravenous, PRN, Jaya Williamson MD    magnesium hydroxide (MILK OF MAGNESIA) 400 MG/5ML suspension 30 mL, 30 mL, Oral, Daily PRN, Jaya Williamson MD    ondansetron [facility]) injection 4 mg, 4 mg, Intravenous, Q6H PRN, Jaya Williamson MD    enoxaparin (LOVENOX) injection 40 mg, 40 mg, Subcutaneous, Daily, Jaya Williamson MD, 40 mg at 05/30/19 0835    acetaminophen (TYLENOL) suppository 650 mg, 650 mg, Rectal, Q4H PRN, Jaya Williamson MD    chlorhexidine (PERIDEX) 0.12 % solution 15 mL, 15 mL, Mouth/Throat, BID, Jaya Williamson MD, 15 mL at 05/30/19 0818    fentaNYL (SUBLIMAZE) 2,000 mcg in dextrose 5 % 200 mL, 25 mcg/hr, Intravenous, Continuous, Jaya Williamson MD, Last Rate: 2.5 mL/hr at 05/29/19 2352, 25 mcg/hr at 05/29/19 2352    propofol 1000 MG/100ML injection, 10 mcg/kg/min (Order-Specific), Intravenous, Continuous, Jaya Williamson MD, Last Rate: 22.4 mL/hr at 05/30/19 0842, 45 mcg/kg/min at 05/30/19 0842    ampicillin-sulbactam (UNASYN) 3 g ivpb minibag, 3 g, Intravenous, Q6H, Jaya Williamson MD, Stopped at 05/30/19 0643    insulin lispro (HUMALOG) injection pen 0-6 Units, 0-6 Units, Subcutaneous, TID WC, Serge Molina MD    insulin lispro (HUMALOG) injection pen 0-3 Units, 0-3 Units, Subcutaneous, Nightly, Serge Molina MD, 1 Units at 05/30/19 0032    glucose (GLUTOSE) 40 % oral gel 15 g, 15 g, Oral, PRN, Serge Molina MD    dextrose 50 % solution 12.5 g, 12.5 g, Intravenous, PRN, Serge Molina MD    glucagon (rDNA) injection 1 mg, 1 mg, Intramuscular, PRN, Serge Molina MD    dextrose 5 % solution, 100 mL/hr, Intravenous, PRN, Serge Molina MD    methylPREDNISolone sodium (SOLU-MEDROL) injection 80 mg, 80 mg, Intravenous, Daily, Serge Molina MD, 80 mg at 05/30/19 3092  Past Medical History:   Diagnosis Date    Arthritis     Diabetes ([address]     High blood pressure      Past Surgical History:   Procedure Laterality Date    KNEE SURGERY      left    LEG SURGERY      TUBAL LIGATION         FAMILY HISTORY:  Reviewed. No significant family history. REVIEW OF SYSTEMS: All pertinent positive and negative findings have been reviewed in HPI. Otherwise, all all systems are reviewed and are negative. PHYSICAL EXAM:    VITALS:  /70   Pulse 75   Temp 98.4 °F (36.9 °C) (Axillary)   Resp 17   Ht 5' 4\" (1.626 m)   Wt 181 lb 3.5 oz (82.2 kg)   SpO2 100%   BMI 31.11 kg/m²   WELL DEVELOPED WELL NOURISHED. NO PERIPHERAL EDEMA. NO ACUTE RESPIRATORY DISTRESS. INTUBATED  ORIENTED X 3.  VOICE: Intubated  HEARING BY CONFRONTATION IS NORMAL. HEAD AND FACE: Normal  EXTERNAL EARS AND NOSE : Normal  EXTRAOCULAR MUSCLES:  Intact  FACIAL MUSCLES: Normal strength  FACE PALPATION: Normal  OTOSCOPY: Normal tympanic membranes and middle ears  INTRANASAL: Septum midline. meati clear. Turbinates normal.  LIPS, TEETH, GINGIVA: Normal  BUCCAL MUCOSA: Normal  PHARYNX: Normal.  No swelling of floor of mouth  NECK: Soft edema of right submandibular space. SALIVARY GLANDS: Normal  THYROID: Normal  NASOPHARYNX, HYPOPHARYNX, LARYNX: No indication for examination based on symptoms. CT IMAGES REVIEWED:  No abscess formation  IMPRESSION: No indicatio for ENT intervention  RECOMMENDATIONS: Will be available for further consultation if needed.

## 2019-05-30 NOTE — PROGRESS NOTES
Morning repeat ABG results given to MD. No changes to vent settings at this time. Will order a repeat ABG for 2 hours from now. Will continue to monitor.

## 2019-05-30 NOTE — PROGRESS NOTES
Hospitalist ICU Progress Note    Date of Admission: 5/29/2019    Chief Complaint: neck swelling    Hospital Course: Pt admitted to ICU for angioedema     Subjective: pt doing well post extubation     Exam:    /61   Pulse 82   Temp 98.4 °F (36.9 °C) (Axillary)   Resp 16   Ht 5' 4\" (1.626 m)   Wt 181 lb 3.5 oz (82.2 kg)   SpO2 97%   BMI 31.11 kg/m²     General appearance: No apparent distress, pt appears ill. HEENT: Pupils equal, round, and reactive to light. Conjunctivae/corneas clear. Neck: Supple, with full range of motion. No jugular venous distention. Trachea midline. Respiratory:  Clear to auscultation, bilaterally without RALES/WHEEZES/Rhonchi. Cardiovascular: Regular rate and rhythm with normal S1/S2 without MURMURS, rubs or gallops. Abdomen: Soft, non-tender, non-distended with normal bowel sounds. Musculoskeletal: No clubbing, cyanosis or EDEMA bilaterally. Full range of motion without deformity. Skin: Skin color, texture, turgor normal.  No rashes or lesions. Neurologic:  Neurovascularly intact without any focal sensory/motor deficits. Cranial nerves: II-XII intact, grossly non-focal.  Psychiatric: Alert and oriented, thought content appropriate, normal insight  Capillary Refill: Brisk,< 3 seconds   Peripheral Pulses: +2 palpable, equal bilaterally     Assessment/Plan:    Acute Medical Issues Being Addressed:  Angioedema- pt extubated successfully today- ENT to see- ID consulted. Resolved Medical Issues during this hospital stay:  n/a    Chronic Stable Medical Issue --> cont. home regimen as appropriate or otherwise noted:  DM2  HTN  HLD  obesity    Code Status: Full Code    Hospitalist will assume care once patient is ready for transfer to general wards.     Vini Jackson MD

## 2019-05-30 NOTE — CONSULTS
MAGNESIA) 400 MG/5ML suspension 30 mL Daily PRN   ondansetron (ZOFRAN) injection 4 mg Q6H PRN   enoxaparin (LOVENOX) injection 40 mg Daily   acetaminophen (TYLENOL) suppository 650 mg Q4H PRN   chlorhexidine (PERIDEX) 0.12 % solution 15 mL BID   fentaNYL (SUBLIMAZE) 2,000 mcg in dextrose 5 % 200 mL Continuous   propofol 1000 MG/100ML injection Continuous   ampicillin-sulbactam (UNASYN) 3 g ivpb minibag Q6H   insulin lispro (HUMALOG) injection pen 0-6 Units TID WC   insulin lispro (HUMALOG) injection pen 0-3 Units Nightly   glucose (GLUTOSE) 40 % oral gel 15 g PRN   dextrose 50 % solution 12.5 g PRN   glucagon (rDNA) injection 1 mg PRN   dextrose 5 % solution PRN   methylPREDNISolone sodium (SOLU-MEDROL) injection 80 mg Daily       Family History:   No family history on file. Social History:   TOBACCO:   reports that she has never smoked. She does not have any smokeless tobacco history on file. ETOH:   reports that she does not drink alcohol. DRUGS:   reports that she does not use drugs. ROS: A 14 point review of systems was conducted, significant findings as noted in HPI. All other systems negative. Physical exam:    Vitals:    05/30/19 0200 05/30/19 0230 05/30/19 0300 05/30/19 0437   BP: (!) 96/57 (!) 95/58 (!) 102/58    Pulse: 77 75 76 76   Resp: 20 20 20 17   Temp:       TempSrc:       SpO2: 98% 99% 99% 98%       General appearance: alert, no acute distress, grooming appropriate  HEENT: PERRLA, no scleral icterus. Trachea midline, extensive soft tissue edema noted diffusely.  ET tube in place   Chest/Lungs: CTAB, ventilated  Cardiovascular: RRR, no murmurs/gallops/rubs  Abdomen: soft, non-tender, non-distended, +BS, no guarding/rigidity  Skin: warm and dry, no rashes  Extremities: no edema, no cyanosis  Neuro: A&Ox3, no focal deficits, sensation intact    Labs:    CBC: Recent Labs     05/29/19  1513   WBC 2.7*   HGB 11.4*   HCT 34.7*   MCV 84.2   PLT 79*     BMP: Recent Labs     05/28/19  9979 05/29/19  1513   NA  --  141   K  --  4.1   CL  --  103   CO2  --  24   BUN 17 21*   CREATININE 0.8 0.9     PT/INR: No results for input(s): PROTIME, INR in the last 72 hours. APTT: No results for input(s): APTT in the last 72 hours. Liver Profile:  Lab Results   Component Value Date    AST 18 05/29/2019    ALT 15 05/29/2019    BILITOT 0.3 05/29/2019    ALKPHOS 71 05/29/2019   No results found for: CHOL, HDL, TRIG  UA: Lab Results   Component Value Date    COLORU Yellow 05/29/2019    PHUR 6.0 05/29/2019    CLARITYU Clear 05/29/2019    SPECGRAV <=1.005 05/29/2019    LEUKOCYTESUR Negative 05/29/2019    UROBILINOGEN 0.2 05/29/2019    BILIRUBINUR Negative 05/29/2019    BLOODU Negative 05/29/2019    GLUCOSEU Negative 05/29/2019       Imaging:   No orders to display          Assessment/Plan: This is a 58 y.o. female with Hx of with Hx of DM and HTN was transferred from OSH to with swollen tongue and compromised airway requiring intubation and sedation. CT scan concerning for Jluis angina. Continue treatment with abx and steroid to decrease swelling. Needle aspiration vs I and D may be indicated if patient not responding to abx treatment. Leak test to assess patient readiness for extubation. May need laryngoscopy for further examination    Discussed with senior resident, will discuss with attending.         Carolina Kruse MD  PGY-1 General Surgery  05/30/19  5:35 AM  [phone]

## 2019-05-30 NOTE — ED NOTES
Pt. Remains comfortably sedated on vent w/vss at this time. piv to R hand and R ac appear wnl. Nsg report given to Leobardo Avila RN @ [facility]. Patient consented verbally to transfer prior to intubation.      Dois Hatchet, RN  05/29/19 2015

## 2019-05-30 NOTE — PROGRESS NOTES
4 Eyes Admission Assessment     I agree as the admission nurse that 2 RN's have performed a thorough Head to Toe Skin Assessment on the patient. ALL assessment sites listed below have been assessed on admission. Areas assessed by both nurses: Yes  [x]   Head, Face, and Ears   [x]   Shoulders, Back, and Chest  [x]   Arms, Elbows, and Hands   [x]   Coccyx, Sacrum, and Ischum  [x]   Legs, Feet, and Heels        Does the Patient have Skin Breakdown?   No   Blanchable redness noted to the coccyx and heels       Deep Prevention initiated:  Yes   Wound Care Orders initiated:  Yes  NG in place     Valente Viera consulted for Pressure Injury (Stage 3,4, Unstageable, DTI, NWPT, and Complex wounds):  No      Nurse 1 eSignature: Electronically signed by Bhavana Palomo RN on 5/30/19 at 1:07 AM    **SHARE this note so that the co-signing nurse is able to place an eSignature**    Nurse 2 eSignature: {Esignature:[id number]}

## 2019-05-30 NOTE — CARE COORDINATION
Case Management Admission Assessment     2019  [facility])  Clinical Case Management Department    Patient: Kathy Pro  MRN: [id number] / : 1955  ACCT: [de-identified]        Admission Documentation  Attending Provider: Jimbo Boyer MD  Admit date/time: 2019 10:12 PM  Status: Inpatient [101]  Diagnosis: Mouth swelling     Readmission within last 30 days:  no     Kathy Pro is a 58 y.o. female with a past medical history of Type II diabetes mellitus and hypertension who [facility] as a transfer from [facility] for presumed R [address] 55 angina. The patient had initially presented to the emergency department at [facility] complaining of sudden-onset tongue swelling and shortness of breath. The patient reported that the tongue swelling had begun approximately two-hours prior to presentation in the ED and had gotten progressively worse. She denied recent dental procedures or dental caries. The patient stated that she was feeling nauseous and had vomited earlier in the day. Shortly after vomiting, she noticed a painless \"lump\" on her tongue that was rapidly enlarging. In the ED, the patient was afebrile but uncomfortable appearing. Swelling was noted in the submandibular spaces bilaterally (more prominent on the right). CBC and CMP were obtained and were roughly within normal limits. CT scan of the head and neck was done and was remarkable for right submandibular periglandular fluid with extension into the submental and sublingual compartments. Due to concern for possible Jluis's angina with potential airway compromise, the patient was intubated and sedated. She also received a dose of subcutaneous epinephrine and dexamethasone. ENT was consulted and recommended started Unasyn and transferring the [facility]. She is admitted to internal medicine for further management.      Met with patient at bedside this afternoon after extubation. Patient lives alone but grandson is there most of the time. No HHC services or DME in the home. Patient is disabled and does not work but she is an active . CM will continue to follow and assist with discharge planning as needed. PTA Living Situation  Discharge Planning  Living Arrangements: Children  Support Systems: Family Members, Children, Friends/Neighbors  Potential Assistance Needed: Skilled Nursing Facility  Type of Home Care Services: None  Patient expects to be discharged to[de-identified] Home  Expected Discharge Date: 06/03/19    Service Assessment       Values / Beliefs  Do you have any ethnic, cultural, sacramental, or spiritual [place] needs you would like us to be aware of while you are in the hospital?: No    Advance Directives (For Healthcare)  Healthcare Directive: No, patient does not have an advance directive for healthcare treatment              [address] [facility]/Skilled Nursing   Home care at home? No        Pharmacy: [facility] in [place] Medications:  No  Does patient want to participate in local refill/meds to beds program?: No    Discharge Plan   Patient expects to be discharged to[de-identified] Home         Barriers to discharge: extubated today    Role of Case Management discussed with patient/family/designee.      Yenifer Cruz RN, BSN  [facility]  Case Management Department  Ph: [phone]

## 2019-05-30 NOTE — CONSULTS
Infectious Diseases Inpatient Consult Note    RESIDENT NOTE - reviewed / edited, attending note at bottom    Reason for Consult:   Neck swelling  Requesting Physician:   Dr. Rabia Hunter   Primary Care Physician:  MARSHA Ramsey CNP  History Obtained From:   Pt, EPIC    Admit Date: 5/29/2019  Hospital Day: 2    CHIEF COMPLAINT:    Neck swelling      HISTORY OF PRESENT ILLNESS:      57 yo female with DMII and HTN who was transferred to [facility] ICU on 5/29/2019 from [facility] ED for R Pelourinho 56 angina. In ED, swelling extended down patient's right side of neck. CT scan revealed significant submandicular and submental swelling. Patient was consented with intubation to protect airway prior transfer to [facility] ICU. Unasyn was started prior to transfer. Patient currently intubated and was not awake during encounter. Further H&P deferred. Past Medical History:    Past Medical History:   Diagnosis Date    Arthritis     Diabetes ([address]     High blood pressure        Past Surgical History:    Past Surgical History:   Procedure Laterality Date    KNEE SURGERY      left    LEG SURGERY      TUBAL LIGATION         Current Medications:     sodium chloride flush  10 mL Intravenous 2 times per day    enoxaparin  40 mg Subcutaneous Daily    chlorhexidine  15 mL Mouth/Throat BID    ampicillin-sulbactam  3 g Intravenous Q6H    insulin lispro  0-6 Units Subcutaneous TID WC    insulin lispro  0-3 Units Subcutaneous Nightly    methylPREDNISolone  80 mg Intravenous Daily       Allergies:  Codeine and Morphine    Social History:      Tobacco Use   Smoking Status Never Smoker      Social History          Substance and Sexual Activity   Alcohol Use No      Patient lives at home. Family History:   No significant family history.     REVIEW OF SYSTEMS:    Unable to obtain, pt on vent    PHYSICAL EXAM:      Vitals:    /70   Pulse 75   Temp 98.4 °F (36.9 °C) (Axillary)   Resp 17   Ht 5' 4\" was transferred to [facility] ICU on 5/29/2019 from [facility] for right submandibular space swelling from sudden onset of tongue swelling and shortness of breath. CT neck reveals right submandibular periglandular fluid with Jluis's angina with deep cellulitis. WBC 3.4, ESR 92, CRP 4.9. Currently treating with abx and steroid.      -Unasyn 3g q6h  -Methylprednisolone 80 mg daily   -blood cultures x2 pending   -ENT consulted; no intervention at this time. Will consider needle aspiration vs I&D if pt doesn't not respond to abx.  -Plan to do Leak test for possible extubation. Possible laryngoscopy if needed. Seen at bedside with Dr. Jorge Flynn. Maurilio Stewart DPM     Addendum to Resident Consult note:  Pt seen, examined and evaluated. I have reviewed the current history, physical findings, labs and assessment and plan and agree with note as documented by resident (Dr. Anant Holder). 57 yo woman with DM    Present to [facility] ED with 'tongue swelling', difficulty swallowing. Pt unable to provide history. Brother is at bedside and reports abrupt onset of SOB, tongue swelling on day of admission. Unknown is she had neck pain prior to this. She had dental work approx 1 - 1.5 mo upper left jaw. Ad [facility] ED - Afebrile, WBC 2.7. CT with R submandibular fluid.   Intubated and transferred to [address] [place] [address] to [facility] ICU, on vent, started on steroids, unasyn  Seen by ENT, no surg indicated    IMP/  Acute onset upper airway obstruction - 'tongue swelling'  Probable neck deep space infection, 'Jluis's angina'    REC/  Agree with unasyn empirically  Will review CT with radiologist  Await further hx from pt after extubated    Will follow  Dejon Billings MD

## 2019-05-30 NOTE — CONSULTS
Nutrition Assessment    Type and Reason for Visit: Initial, Consult    Nutrition Recommendations:   1. RD strongly suggest SLP evaluation for safest diet advancement. 2. If unable to advance diet, suggest start of EN to meet nutrition needs until PO diet able to advance. Consult RD for recommendations. Nutrition Assessment: RD consult for TF ordering & mgt orderset from vent. Pt intubated x 1 day w/plans to extubate today. Nutritionally compromised as NPO through admission thus far and concern for swallow function impedeing diet advancement. ENT not indicated. RD suggests SLP eval once extubated to assess for safest diet. RD will monitor. (admit w/swollen tongue and compromised airway; intub on adm)    Malnutrition Assessment:  · Malnutrition Status: At risk for malnutrition  · Context: Acute illness or injury  · Findings of the 6 clinical characteristics of malnutrition (Minimum of 2 out of 6 clinical characteristics is required to make the diagnosis of moderate or severe Protein Calorie Malnutrition based on AND/ASPEN Guidelines):  1. Energy Intake-Less than or equal to 50% of estimated energy requirement, (x 1 day)    2. Weight Loss-No significant weight loss,    3. Fat Loss-Unable to assess,    4. Muscle Loss-Unable to assess,    5. Fluid Accumulation-No significant fluid accumulation,    6.   Strength-Not measured    Nutrition Risk Level: High    Nutrient Needs:  · Estimated Daily Total Kcal: 2484-6130 (15-18)  · Estimated Daily Protein (g): (1.8-2.0)  · Estimated Daily Total Fluid (ml/day): 1500 ml min     Nutrition Diagnosis:   · Problem: Inadequate oral intake  · Etiology: related to Acute injury/trauma     Signs and symptoms:  as evidenced by NPO status due to medical condition    Objective Information:  · Nutrition-Focused Physical Findings:    · Wound Type: None  · Current Nutrition Therapies:  · Oral Diet Orders: NPO   · Oral Diet intake: NPO  · Oral Nutrition Supplement (ONS) Orders: None  · ONS intake: NPO  · Anthropometric Measures:  · Ht: 5' 4\" (162.6 cm)   · Current Body Wt: 181 lb 3.5 oz (82.2 kg)  · Ideal Body Wt: 120 lb (54.4 kg),   · BMI Classification: BMI 30.0 - 34.9 Obese Class I    Nutrition Interventions:   Continue NPO  Continued Inpatient Monitoring    Nutrition Evaluation:   · Evaluation: Goals set   · Goals: pt will tolerate start of most appropriate form of nutrition     · Monitoring: Nutrition Progression      Electronically signed by Esme Linda RD, LD on 5/30/19 at 3:16 PM    Contact Number: [phone]

## 2019-05-30 NOTE — H&P
 LEG SURGERY      TUBAL LIGATION       Social History     Substance and Sexual Activity   Alcohol Use No     Social History     Substance and Sexual Activity   Drug Use No     Social History     Tobacco Use   Smoking Status Never Smoker     FMHx:  No family history on file. No hx of CAD, Cancer, DM  MEDICATIONS:     No current facility-administered medications on file prior to encounter. Current Outpatient Medications on File Prior to Encounter   Medication Sig Dispense Refill    omeprazole (PRILOSEC) 20 MG delayed release capsule Take 20 mg by mouth daily      IRON PO Take 65 mg by mouth      Cholecalciferol (VITAMIN D PO) Take 1.25 mg by mouth      Omega-3 Fatty Acids (FISH OIL) 435 MG CAPS Take by mouth      SITagliptin Phosphate (JANUVIA PO) Take 50 mg by mouth      buPROPion HCl (WELLBUTRIN PO) Take 300 mg by mouth      PARoxetine (PAXIL) 20 MG tablet       simvastatin (ZOCOR) 20 MG tablet       losartan-hydrochlorothiazide (HYZAAR) 100-25 MG per tablet       metformin (GLUCOPHAGE) 500 MG tablet 1,000 mg        Scheduled Meds:   sodium chloride flush  10 mL Intravenous 2 times per day    [START ON 5/30/2019] enoxaparin  40 mg Subcutaneous Daily    chlorhexidine  15 mL Mouth/Throat BID    [START ON 5/30/2019] ampicillin-sulbactam  3 g Intravenous Q6H      Continuous Infusions:   fentaNYL 2000 mcg/200 mL IV infusion      propofol       PRN Meds:sodium chloride flush, magnesium hydroxide, ondansetron, acetaminophen  Allergies: Allergies   Allergen Reactions    Codeine     Morphine Nausea And Vomiting     REVIEW OF SYSTEMS:       · Unable to obtain; patient is intubated and sedated. PHYSICAL EXAM:       Vitals: There were no vitals taken for this visit. I/O:  No intake or output data in the 24 hours ending 05/29/19 2236  No intake/output data recorded. No intake/output data recorded. Physical Examination:   · General appearance: Intubated and sedated.    · Skin: Skin color, texture, turgor normal.   · HEENT: Submandibular and submental space swelling appreciated bilaterally. · Neck: Swelling appreciated in submandibular and submental spaces. · Lungs: Crackles appreciated bilaterally. · Heart: regular rate and rhythm, S1, S2 normal, no murmur, click, rub or gallop  · Abdomen: soft, non-tender; bowel sounds normal; no masses,  no organomegaly  · Extremities: Trace edema present in lower extremities bilaterally. · Lymphatic: No significant lymph node enlargement papable  · Neurologic: Unable to assess; patient is intubated and sedated. DATA:       Labs:  CBC:   Recent Labs     05/29/19  1513   WBC 2.7*   HGB 11.4*   HCT 34.7*   PLT 79*       BMP:   Recent Labs     05/28/19  1535 05/29/19  1513   NA  --  141   K  --  4.1   CL  --  103   CO2  --  24   BUN 17 21*   CREATININE 0.8 0.9   GLUCOSE  --  154*     LFT's:   Recent Labs     05/29/19  1513   AST 18   ALT 15   BILITOT 0.3   ALKPHOS 71     Troponin: No results for input(s): TROPONINI in the last 72 hours. BNP: No results for input(s): BNP in the last 72 hours. ABGs: No results for input(s): PHART, EPG5AVK, PO2ART in the last 72 hours. INR: No results for input(s): INR in the last 72 hours. U/A:  Recent Labs     05/29/19  1701   COLORU Yellow   PHUR 6.0   CLARITYU Clear   SPECGRAV <=1.005   LEUKOCYTESUR Negative   UROBILINOGEN 0.2   BILIRUBINUR Negative   BLOODU Negative   GLUCOSEU Negative     Rad: CT head and neck showing right submandibular periglandular fluid interspersed in the submental and sublingual compartments. EKG: No EKG obtained this admission. ASSESSMENT AND PLAN:   Artie Deleon is a 58 y.o. female with a past medical history of Type II diabetes mellitus and hypertension who presented to [facility] intensive care unit on 5/29/2019 as a transfer from [facility] for presumed R Pelourinho 56 angina.  The patient had initially presented to the emergency department at [facility] complaining of -Hold home Januvia due to NPO status   -Low-dose sliding scale insulin   -Hypoglycemia protocol     HTN   -Holding home PO medications while NPO   -Continue to monitor     HLD  -Holding home simvastatin 20 mg while NPO     Code Status:Full Code  FEN: NPO  PPX: Lovenox 40  DISPO: ICU for further management as above.    This patient has been staffed and discussed with Dr. Kasandra Seaman MD.   -----------------------------  Matthew Pastor M.D.   PGY-1 Internal Medicine  [employer]  5/29/2019 10:36 PM

## 2019-05-30 NOTE — PROGRESS NOTES
Patient admitted to room 4512 from [facility] via squad. Patient intubated and sedated on arrival. Upon admission patient very agitated, fighting the ventilator, and attempting to remove the ETT. Medical residents at bedside, bolus of Propofol given at this time and a continuous Fentanyl drip ordered. Continuous Propofol drip increased per MD orders to help sedate patient to a goal RASS of -1-+1. Patient connected to the ICU monitor, chlorhexidine bath complete, and sacral heart in place. Blanchable redness noted to the coccyx and bilateral heels. D/t sedation needs, patient unable to participate in neuro assessment at this time. Pupils equal, round, and reactive, responsive to pain, and positive cough and gag noted. Will attempt to wean sedation down to patients goal RASS. Patient repositioned in bed, call light within reach, and bilateral soft wrist restraints in place per MD orders. Will continue to monitor.

## 2019-05-30 NOTE — PROGRESS NOTES
MECHANICAL VENTILATION WEANING PROTOCOL    PRE-TRIAL PATIENT ASSESSMENT - COMPLETED AT 1137    Ventilatory Assessment:    PARAMETER CRITERIA FOR WEANING   Spontaneous Cough:  Yes    Sputum Characteristics:  · Sputum Amount: Moderate  · Tenacity: Thick  · Sputum Color: Clear, White SPONTANEOUS COUGH With small to moderate  Amount of secretions   FiO2 : 40 % FIO2 less than or equal to 50%     PEEP less than or equal to 8   Progressive Mobility Protocol  No     ABG:  Lab Results   Component Value Date    PHART 7.367 05/30/2019    KVT6KQD 46.4 05/30/2019    PO2ART 67.0 05/30/2019    X0KYOVVF 92 05/30/2019    MZM2KLY 26.6 05/30/2019    BEART 1 05/30/2019     HGB/WBC:  Lab Results   Component Value Date    HGB 10.5 05/30/2019    WBC 3.4 05/30/2019        Vital Signs:    PARAMETER CRITERIA FOR WEANING Meets Criteria   Pulse: 72 Within patient's normal limits / stable Yes   Resp: 12 Less than or equal to 30 Yes   BP: (!) 89/48 Within patient's normal limits / minimal pressors (Hemodynamically Stable) No   SpO2: 99 % Greater than or equal to 90% Yes   End Tidal CO2: 39 (%) Within patient's normal limits Yes   Temp: 98.4 °F (36.9 °C) Less than 38. 5oC / 101. 3oF Yes   Dr Juliana Montes wanted pt placed on wean. [x]    Based on this assessment and the [facility] Ventilator Weaning Protocol, this patient  IS being placed on a Spontaneous Breathing Trial (SBT) at this time.  []    Based on this assessment and the [facility] Ventilator Weaning Protocol, this patient  IS NOT being placed on a Spontaneous Breathing Trial (SBT) at this time. []    Patient  IS NOT being placed on a Spontaneous Breathing Trial (SBT) at this time because of factors not previously addressed.   Those factors include        SBT - Initiated at  1137  Ventilator Settings:  CPAP - 5 cmH2O, PS - 8 cmH2O(if using settings other than CPAP 5/PS 8, please explain reasons for settings here):       1 Minute SBT Respiratory Parameters:   VE: 9.3 L   RR: 17 b/m   VT: 547 mL (average VT = VE/RR)   RSBI: 31 (RR/VT in liters)   ETCO2: 46 cmH2O   SPO2: 100 %   If on sedation, amount and type Fentanyl 25    [x]   RR is less than 35, RSBI is less than 100, patient's vitals signs are stable, and patient is in no apparent distress; therefore, patient is being left on SBT for up to 1 hour. []   RR is greater than 35, RSBI is greater than 100, VS's are unstable, or patient is in distress; therefore, patient is being placed back on previous settings. SBT - Concluded at  1218. Weaning Parameters/VS's at conclusion of SBT:   VE: 7.2 L   RR: 17 b/m   VT: 424 mL (average VT = VE/RR)   RSBI: 40 (RR/VT in liters)   ETCO2: 32 cmH2O   SPO2: 99 %    If on sedation, amount and type none    [x]   Patient tolerated SBT for full 60 minutes with acceptable weaning parameters and vital signs and showed no signs or distress. []   Patient tolerated SBT for full 60 minutes, but had unacceptable weaning parameters or vital signs, and/or signs of distress. []   Patient was unable to tolerate SBT for 60 minutes and was placed back on previous settings.             COMMENTS:

## 2019-05-30 NOTE — PROGRESS NOTES
Follow up ABG as follows:   Ref. Range 5/30/2019 00:17   pH, Arterial Latest Ref Range: 7.350 - 7.450  7.400   pCO2, Arterial Latest Ref Range: 35.0 - 45.0 mm Hg 42.8   pO2, Arterial Latest Ref Range: 75.0 - 108.0 mm Hg 119.6 (H)   HCO3, Arterial Latest Ref Range: 21.0 - 29.0 mmol/L 26.5   TCO2 (calc), Art Latest Ref Range: Not Established mmol/L 28   Base Excess, Arterial Latest Ref Range: -3 - 3  2   O2 Sat, Arterial Latest Ref Range: 93 - 100 % 99   Vent settings to remain the same and patient tolerating well. Will continue to monitor.

## 2019-05-31 LAB
ALBUMIN SERPL-MCNC: 4 G/DL (ref 3.4–5)
ANION GAP SERPL CALCULATED.3IONS-SCNC: 11 MMOL/L (ref 3–16)
ANION GAP SERPL CALCULATED.3IONS-SCNC: 14 MMOL/L (ref 3–16)
BASOPHILS ABSOLUTE: 0 K/UL (ref 0–0.2)
BASOPHILS RELATIVE PERCENT: 0.1 %
BUN BLDV-MCNC: 16 MG/DL (ref 7–20)
BUN BLDV-MCNC: 17 MG/DL (ref 7–20)
CALCIUM SERPL-MCNC: 8.5 MG/DL (ref 8.3–10.6)
CALCIUM SERPL-MCNC: 9 MG/DL (ref 8.3–10.6)
CHLORIDE BLD-SCNC: 104 MMOL/L (ref 99–110)
CHLORIDE BLD-SCNC: 105 MMOL/L (ref 99–110)
CO2: 20 MMOL/L (ref 21–32)
CO2: 27 MMOL/L (ref 21–32)
CREAT SERPL-MCNC: 0.9 MG/DL (ref 0.6–1.2)
CREAT SERPL-MCNC: 1 MG/DL (ref 0.6–1.2)
EOSINOPHILS ABSOLUTE: 0 K/UL (ref 0–0.6)
EOSINOPHILS RELATIVE PERCENT: 0.4 %
GFR AFRICAN AMERICAN: >60
GFR AFRICAN AMERICAN: >60
GFR NON-AFRICAN AMERICAN: 56
GFR NON-AFRICAN AMERICAN: >60
GLUCOSE BLD-MCNC: 104 MG/DL (ref 70–99)
GLUCOSE BLD-MCNC: 124 MG/DL (ref 70–99)
GLUCOSE BLD-MCNC: 144 MG/DL (ref 70–99)
GLUCOSE BLD-MCNC: 242 MG/DL (ref 70–99)
GLUCOSE BLD-MCNC: 268 MG/DL (ref 70–99)
GLUCOSE BLD-MCNC: 321 MG/DL (ref 70–99)
HCT VFR BLD CALC: 32.2 % (ref 36–48)
HEMOGLOBIN: 10.3 G/DL (ref 12–16)
INR BLD: 1.35 (ref 0.86–1.14)
LYMPHOCYTES ABSOLUTE: 1 K/UL (ref 1–5.1)
LYMPHOCYTES RELATIVE PERCENT: 25.3 %
MAGNESIUM: 2.2 MG/DL (ref 1.8–2.4)
MCH RBC QN AUTO: 26.8 PG (ref 26–34)
MCHC RBC AUTO-ENTMCNC: 32 G/DL (ref 31–36)
MCV RBC AUTO: 83.8 FL (ref 80–100)
MONOCYTES ABSOLUTE: 0.5 K/UL (ref 0–1.3)
MONOCYTES RELATIVE PERCENT: 13.9 %
NEUTROPHILS ABSOLUTE: 2.3 K/UL (ref 1.7–7.7)
NEUTROPHILS RELATIVE PERCENT: 60.3 %
PDW BLD-RTO: 15.6 % (ref 12.4–15.4)
PERFORMED ON: ABNORMAL
PHOSPHORUS: 2.8 MG/DL (ref 2.5–4.9)
PLATELET # BLD: 75 K/UL (ref 135–450)
PMV BLD AUTO: 7.5 FL (ref 5–10.5)
POTASSIUM REFLEX MAGNESIUM: 4.4 MMOL/L (ref 3.5–5.1)
POTASSIUM SERPL-SCNC: 4.3 MMOL/L (ref 3.5–5.1)
PROTHROMBIN TIME: 15.4 SEC (ref 9.8–13)
RBC # BLD: 3.84 M/UL (ref 4–5.2)
SODIUM BLD-SCNC: 139 MMOL/L (ref 136–145)
SODIUM BLD-SCNC: 142 MMOL/L (ref 136–145)
WBC # BLD: 3.8 K/UL (ref 4–11)

## 2019-05-31 PROCEDURE — 85610 PROTHROMBIN TIME: CPT

## 2019-05-31 PROCEDURE — 6370000000 HC RX 637 (ALT 250 FOR IP): Performed by: STUDENT IN AN ORGANIZED HEALTH CARE EDUCATION/TRAINING PROGRAM

## 2019-05-31 PROCEDURE — 6360000002 HC RX W HCPCS: Performed by: STUDENT IN AN ORGANIZED HEALTH CARE EDUCATION/TRAINING PROGRAM

## 2019-05-31 PROCEDURE — 99232 SBSQ HOSP IP/OBS MODERATE 35: CPT | Performed by: INTERNAL MEDICINE

## 2019-05-31 PROCEDURE — 2580000003 HC RX 258: Performed by: STUDENT IN AN ORGANIZED HEALTH CARE EDUCATION/TRAINING PROGRAM

## 2019-05-31 PROCEDURE — 83735 ASSAY OF MAGNESIUM: CPT

## 2019-05-31 PROCEDURE — 85025 COMPLETE CBC W/AUTO DIFF WBC: CPT

## 2019-05-31 PROCEDURE — 94660 CPAP INITIATION&MGMT: CPT

## 2019-05-31 PROCEDURE — 2700000000 HC OXYGEN THERAPY PER DAY

## 2019-05-31 PROCEDURE — 36415 COLL VENOUS BLD VENIPUNCTURE: CPT

## 2019-05-31 PROCEDURE — 1200000000 HC SEMI PRIVATE

## 2019-05-31 PROCEDURE — 80069 RENAL FUNCTION PANEL: CPT

## 2019-05-31 PROCEDURE — 94761 N-INVAS EAR/PLS OXIMETRY MLT: CPT

## 2019-05-31 RX ORDER — VALSARTAN 80 MG/1
20 TABLET ORAL DAILY
Status: DISCONTINUED | OUTPATIENT
Start: 2019-05-31 | End: 2019-06-03 | Stop reason: HOSPADM

## 2019-05-31 RX ORDER — LIDOCAINE HYDROCHLORIDE 10 MG/ML
5 INJECTION, SOLUTION EPIDURAL; INFILTRATION; INTRACAUDAL; PERINEURAL ONCE
Status: DISCONTINUED | OUTPATIENT
Start: 2019-05-31 | End: 2019-06-03 | Stop reason: HOSPADM

## 2019-05-31 RX ORDER — SODIUM CHLORIDE 0.9 % (FLUSH) 0.9 %
10 SYRINGE (ML) INJECTION EVERY 12 HOURS SCHEDULED
Status: DISCONTINUED | OUTPATIENT
Start: 2019-05-31 | End: 2019-06-03 | Stop reason: HOSPADM

## 2019-05-31 RX ORDER — CANDESARTAN 4 MG/1
4 TABLET ORAL DAILY
Status: DISCONTINUED | OUTPATIENT
Start: 2019-05-31 | End: 2019-05-31

## 2019-05-31 RX ORDER — SODIUM CHLORIDE 0.9 % (FLUSH) 0.9 %
10 SYRINGE (ML) INJECTION PRN
Status: DISCONTINUED | OUTPATIENT
Start: 2019-05-31 | End: 2019-06-03 | Stop reason: HOSPADM

## 2019-05-31 RX ADMIN — INSULIN LISPRO 4 UNITS: 100 INJECTION, SOLUTION INTRAVENOUS; SUBCUTANEOUS at 17:20

## 2019-05-31 RX ADMIN — BUPROPION HYDROCHLORIDE 300 MG: 75 TABLET, FILM COATED ORAL at 08:34

## 2019-05-31 RX ADMIN — METHYLPREDNISOLONE SODIUM SUCCINATE 80 MG: 125 INJECTION, POWDER, FOR SOLUTION INTRAMUSCULAR; INTRAVENOUS at 08:34

## 2019-05-31 RX ADMIN — Medication 10 ML: at 20:48

## 2019-05-31 RX ADMIN — AMPICILLIN SODIUM AND SULBACTAM SODIUM 3 G: 2; 1 INJECTION, POWDER, FOR SOLUTION INTRAMUSCULAR; INTRAVENOUS at 18:59

## 2019-05-31 RX ADMIN — Medication 10 ML: at 08:35

## 2019-05-31 RX ADMIN — INSULIN LISPRO 2 UNITS: 100 INJECTION, SOLUTION INTRAVENOUS; SUBCUTANEOUS at 21:31

## 2019-05-31 RX ADMIN — PAROXETINE HYDROCHLORIDE 30 MG: 20 TABLET, FILM COATED ORAL at 08:31

## 2019-05-31 RX ADMIN — AMPICILLIN SODIUM AND SULBACTAM SODIUM 3 G: 2; 1 INJECTION, POWDER, FOR SOLUTION INTRAMUSCULAR; INTRAVENOUS at 05:52

## 2019-05-31 RX ADMIN — INSULIN LISPRO 2 UNITS: 100 INJECTION, SOLUTION INTRAVENOUS; SUBCUTANEOUS at 12:29

## 2019-05-31 RX ADMIN — VALSARTAN 20 MG: 80 TABLET, FILM COATED ORAL at 21:42

## 2019-05-31 RX ADMIN — SIMVASTATIN 10 MG: 10 TABLET, FILM COATED ORAL at 20:48

## 2019-05-31 RX ADMIN — LINAGLIPTIN 5 MG: 5 TABLET, FILM COATED ORAL at 08:33

## 2019-05-31 RX ADMIN — ENOXAPARIN SODIUM 40 MG: 40 INJECTION SUBCUTANEOUS at 08:35

## 2019-05-31 RX ADMIN — Medication 10 ML: at 21:44

## 2019-05-31 RX ADMIN — AMPICILLIN SODIUM AND SULBACTAM SODIUM 3 G: 2; 1 INJECTION, POWDER, FOR SOLUTION INTRAMUSCULAR; INTRAVENOUS at 13:22

## 2019-05-31 RX ADMIN — OMEPRAZOLE 20 MG: 20 CAPSULE, DELAYED RELEASE ORAL at 08:41

## 2019-05-31 ASSESSMENT — PAIN SCALES - GENERAL
PAINLEVEL_OUTOF10: 0

## 2019-05-31 NOTE — CONSULTS
ICU Progress Note        PGY1    Hospital Day: 3                                                         Code:Full Code  Admit Date: 5/29/2019                                 PCP: MARSHA Lema CNP    ICU Day: 2  Vent Day:1   Antibiotics: Unasyn Indication: Sea Angina  Duration: 2    Diet: DIET CARB CONTROL;    Daily Plan:  5/31/2019     HPI:    59 yo F with PMHx NIDDM, HTN, and R MCA saccular aneurysm who was experiencing maxillary facial pain in March which was later attributed to tooth abscess which was treated with root canal of L cuspid tooth by a dentist in [facility] in April. On day of admission he experienced sudden-onset of R-sided tongue swelling, followed by an acute episode of violent vomiting. She denies any recent sore throat, dysphagia, cough, sick contacts or allergic reaction. She denies any fevers, chills night sweats, nausea or vomiting at present. She has been off antibiotics for about a month. She drove herself to [facility] on 5/29 where CT soft tissue neck identified right submandibular periglandular fluid with extension into the submental and sublingual compartments, consistent with Jluis's angina. She was started on Unasyn and steroids, intubated for airway protection and transferred to [facility] for ENT evaluation. She was extubated 4/46 without complications. Suspect source of submandibular swelling is sublingual leukoplakia and exophytic lesion concerning for malignant process and/or incompletely treated L cuspid tooth abscess. She has a 50 pack year smoking history (smoked 1PPD since she was a teenager). Denies night sweats, chills, fevers or weight loss. States she has stopped smoking one week ago. Subjective:     Patient complaining of R-sided tongue pain and sublingual swelling. Denies dysphagia, SOB or difficulty breathing. Able to maintain her own airway.    She is tolerating diet without difficulty. Medications:     Scheduled Meds:   buPROPion  300 mg Oral Daily    omeprazole  20 mg Oral Daily    PARoxetine  30 mg Oral Daily    simvastatin  10 mg Oral Nightly    linagliptin  5 mg Oral Daily    sodium chloride flush  10 mL Intravenous 2 times per day    enoxaparin  40 mg Subcutaneous Daily    ampicillin-sulbactam  3 g Intravenous Q6H    insulin lispro  0-6 Units Subcutaneous TID WC    insulin lispro  0-3 Units Subcutaneous Nightly    methylPREDNISolone  80 mg Intravenous Daily     Continuous Infusions:   dextrose       PRN Meds:benzocaine-menthol, sodium chloride flush, magnesium hydroxide, ondansetron, acetaminophen, glucose, dextrose, glucagon (rDNA), dextrose    Objective:   Vitals  T-max: Temp (24hrs), Av.1 °F (36.7 °C), Min:97.8 °F (36.6 °C), Max:98.2 °F (36.8 °C)    Patient Vitals for the past 8 hrs:   BP Temp Temp src Pulse Resp SpO2 Weight   19 0827 128/79 97.8 °F (36.6 °C) Oral 73 15 -- --   19 0600 128/73 -- -- 67 14 98 % --   19 0500 121/68 -- -- 69 25 98 % 177 lb 11.1 oz (80.6 kg)   19 0400 120/72 98.2 °F (36.8 °C) Oral 65 15 96 % --   19 0300 118/71 -- -- 70 14 95 % --   19 0200 116/70 -- -- 73 18 98 % --   19 0100 125/73 -- -- 72 15 98 % --       Intake/Output Summary (Last 24 hours) at 2019 0839  Last data filed at 2019 0651  Gross per 24 hour   Intake 1630 ml   Output 775 ml   Net 855 ml       Physical Examination:   · General appearance: Appears comfortable at rest, fully alert and orientated    · HEENT: Atraumatic, moist mucus membranes, obese neck, Swollen, non-tender R submandibular gland, no palpable crepitus of soft neck tissues, no trismus. Peridontal swelling of L incisor and cuspid, no tenderness to palpation of sinuses, or gingiva, no tonsillar exudate. No pain upon rotation of neck or with swallowing. Bilateral leukoplakia and exophytic lesion on floor of mouth.    ·     · Respiratory: Normal respiratory effort. No wheezes, rubs, or crackles  · Cardiovascular: regular S1/S2, with no Murmer, rub or gallop. No JVD  · Abdomen: Soft, non-tender, non-distended  · Musculoskeletal: No clubbing, cyanosis, no lower extremity edema, peripheral pulses present, cap refill < 2sec  · Neurologic: Neurovascularly grossly intact without any focal motor deficits. Cranial nerves:  grossly non-focal.    LABS    CBC:   Recent Labs     05/29/19  1513 05/30/19  0602 05/31/19  0438   WBC 2.7* 3.4* 3.8*   HGB 11.4* 10.5* 10.3*   HCT 34.7* 32.7* 32.2*   PLT 79* 88* 75*   MCV 84.2 82.3 83.8     Renal:   Recent Labs     05/29/19  1513 05/30/19  0602 05/31/19 0438    141 142  139   K 4.1 4.3 4.3  4.4    103 104  105   CO2 24 25 27  20*   BUN 21* 20 16  17   CREATININE 0.9 0.7 0.9  1.0   GLUCOSE 154* 113* 144*  124*   CALCIUM 10.2 9.5 9.0  8.5   MG  --   --  2.20   PHOS  --   --  2.8   ANIONGAP 14 13 11  14     Hepatic:   Recent Labs     05/29/19 1513 05/31/19 0438   AST 18  --    ALT 15  --    BILITOT 0.3  --    PROT 8.4*  --    LABALBU 4.3 4.0   ALKPHOS 71  --      Troponin: No results for input(s): TROPONINI in the last 72 hours. BNP: No results for input(s): BNP in the last 72 hours. Lipids: No results for input(s): CHOL, HDL in the last 72 hours. Invalid input(s): LDLCALCU, TRIGLYCERIDE  ABGs:    Recent Labs     05/30/19  0017 05/30/19  0400 05/30/19  0610   PHART 7.400 7.465* 7.367   IZZ7KFZ 42.8 36.0 46.4*   PO2ART 119.6* 86.9 67.0*   FPU1RSY 26.5 25.9 26.6   BEART 2 2 1   A7XMYKTA 99 97 92*   DVQ3WQE 28 27 28       INR: No results for input(s): INR in the last 72 hours. Lactate: No results for input(s): LACTATE in the last 72 hours. Cultures:  Blood cultures pending  -----------------------------------------------------------------  Imaging:  CT soft tissue neck (5/29):    Right submandibular periglandular fluid, also interspersed in the submental   in sublingual compartments.  Jluis's angina/deep W/D/W/A  -----------------------------  Hernandez Dorsey, PGY1  5/84/1104  8:39 AM     Patient seen, examined and discussed with the resident and I agree with the assessment and plan. Successfully extubated today. Eating and drinking well. Voice back to normal.  Tongue swelling persists. ENT to evaluate for possible head and neck cancer. Continue unasyn.   Transfer to med surg    Milo Velez MD

## 2019-05-31 NOTE — PROGRESS NOTES
Pt admitted to room 6310 from ICU. Alert and oriented x4. VSS. Tele in place. Fall precautions in place d/t strict bedrest order. Will continue to monitor.

## 2019-05-31 NOTE — PLAN OF CARE
4 Eyes Admission Assessment     I agree as the admission nurse that 2 RN's have performed a thorough Head to Toe Skin Assessment on the patient. ALL assessment sites listed below have been assessed on admission. Areas assessed by both nurses:   [x]   Head, Face, and Ears   [x]   Shoulders, Back, and Chest  [x]   Arms, Elbows, and Hands   [x]   Coccyx, Sacrum, and Ischum  [x]   Legs, Feet, and Heels        Does the Patient have Skin Breakdown? No       Blanchable redness to buttocks. Pink heels bilaterally. Scars to left knee.       Deep Prevention initiated:  No   Wound Care Orders initiated:  No      WOC nurse consulted for Pressure Injury (Stage 3,4, Unstageable, DTI, NWPT, and Complex wounds):  No      Nurse 1 eSignature: Electronically signed by Felice Murphy RN on 5/31/19 at 3:36 PM    **SHARE this note so that the co-signing nurse is able to place an eSignature**    Nurse 2 eSignature: Electronically signed by María Elena Godinez RN on 5/31/19 at 4:40 PM

## 2019-05-31 NOTE — PROCEDURES
New PICC order noted - assessed pt for PICC placement. Pt has:     1)  Pending blood cultures   2) No INR level on chart since admission. Blood cultures must be negative or show no growth for at least 48 hrs before PICC can be placed. PT/INR must be on chart before PICC can be placed.

## 2019-05-31 NOTE — PROGRESS NOTES
Patient medications tubed to 6S and room lock box open. Personal belongings to transfer with patient.

## 2019-05-31 NOTE — PROGRESS NOTES
ICU TRANSFER NOTE      5:07 PM2019  Admit Date: 2019   Hospital Day: 3                                                   PCP   Demarcus Nathan, MARSHA - FÉLIX  : 1955                                                       CodeStatus:Full Code  MRN: [id number]                                                        Diet: DIET CARB CONTROL; Milton Cote is a 57 yo female with hx of DM2 and HTN who was admitted for Jluis's angina. Patient had acute onset of tongue swelling, SOB, and subsequently intubated for airway protection. She had root canal of L cuspid tooth in April. Patient was started on IV antibiotics. CT neck showed right submandibular fluid with extension into the submental and sublingual compartments. ENT was consulted patient was seen after extubation. White plaques under the tongue was not deemed to be serious and most likely from trauma and smoking. Patient currently denies SOB, fever, or chest pain. Vitals:    19 1427   BP: (!) 144/73   Pulse: 83   Resp: 18   Temp: 98.5 °F (36.9 °C)   SpO2: 91%   · General appearance: Appears comfortable at rest, fully alert and orientated    · HEENT: Atraumatic, moist mucus membranes, obese neck, Swollen, non-tender R submandibular gland, no palpable crepitus of soft neck tissues, no trismus. Peridontal swelling of L incisor and cuspid, no tenderness to palpation of sinuses, or gingiva, no tonsillar exudate. No pain upon rotation of neck or with swallowing. Bilateral leukoplakia and exophytic lesion on floor of mouth. · Respiratory: Normal respiratory effort. No wheezes, rubs, or crackles  · Cardiovascular: regular S1/S2, with no Murmer, rub or gallop. No JVD  · Abdomen: Soft, non-tender, non-distended  · Musculoskeletal: No clubbing, cyanosis, no lower extremity edema, peripheral pulses present, cap refill < 2sec  · Neurologic: Neurovascularly grossly intact without any focal motor deficits.  Cranial nerves:  grossly non-focal.    Scheduled Meds:   lidocaine 1 % injection  5 mL Intradermal Once    sodium chloride flush  10 mL Intravenous 2 times per day    candesartan  4 mg Oral Daily    buPROPion  300 mg Oral Daily    omeprazole  20 mg Oral Daily    PARoxetine  30 mg Oral Daily    simvastatin  10 mg Oral Nightly    linagliptin  5 mg Oral Daily    sodium chloride flush  10 mL Intravenous 2 times per day    enoxaparin  40 mg Subcutaneous Daily    ampicillin-sulbactam  3 g Intravenous Q6H    insulin lispro  0-6 Units Subcutaneous TID WC    insulin lispro  0-3 Units Subcutaneous Nightly    methylPREDNISolone  80 mg Intravenous Daily     Continuous Infusions:   dextrose       PRN Meds:sodium chloride flush, benzocaine-menthol, sodium chloride flush, magnesium hydroxide, ondansetron, acetaminophen, glucose, dextrose, glucagon (rDNA), dextrose    The objective and subjective findings as well as the ICU course of treatment have been reviewed with the ICU team. The treatment plan has been reviewed with the ICU team. The patient is being transferred to [place] [address] in stable condition.     Marietta Parker, PGY-2  Pager; [phone]  05/31/19  5:07 PM

## 2019-05-31 NOTE — PROGRESS NOTES
ID Follow-up NOTE  RESIDENT NOTE - reviewed / edited, attending note at bottom    CC:   Neck infection, 'Ludwings Angina'  Antibiotics: Unasyn    Admit Date: 5/29/2019  Hospital Day: 3    Subjective:     Patient is doing well this morning. She states her throat was feeling hoarse possibly from being intubated. She feels significantly bettr today. She admits pain on palpation around the anterior neck area. Otherwise she is able to eat well. Denies f/n/v/c/sob/cp. Objective:     Patient Vitals for the past 8 hrs:   BP Temp Temp src Pulse Resp SpO2 Weight   05/31/19 0827 128/79 97.8 °F (36.6 °C) Oral 73 15 -- --   05/31/19 0600 128/73 -- -- 67 14 98 % --   05/31/19 0500 121/68 -- -- 69 25 98 % 177 lb 11.1 oz (80.6 kg)   05/31/19 0400 120/72 98.2 °F (36.8 °C) Oral 65 15 96 % --   05/31/19 0300 118/71 -- -- 70 14 95 % --   05/31/19 0200 116/70 -- -- 73 18 98 % --   05/31/19 0100 125/73 -- -- 72 15 98 % --     I/O last 3 completed shifts: In: 1630 [P.O.:1040; I.V.:390; IV Piggyback:200]  Out: 775 [Urine:775]  No intake/output data recorded. · General appearance: alert, oriented, no distress   · HEENT: Atraumatic, moist mucus membranes, Swollen,no palpable crepitus of soft neck tissues, no trismus. Normal gingiva, pain on palpation to anterior aspect to neck. Firm on left compared to right. Improved swelling to tongue. No pain upon rotation of neck or with swallowing  · Respiratory: Normal respiratory effort. No wheezes, rubs, or crackles  · Cardiovascular: regular S1/S2, with no Murmer, rub or gallop. No JVD  · Abdomen: Soft, non-tender, non-distended  · Musculoskeletal: No clubbing, cyanosis, no lower extremity edema, peripheral pulses present, cap refill < 2sec  · Neurologic: Neurovascularly grossly intact without any focal motor deficits.  Cranial nerves:  grossly non-focal.     Data Review:  Lab Results   Component Value Date    WBC 3.8 (L) 05/31/2019    HGB 10.3 (L) 05/31/2019    HCT 32.2 (L) 05/31/2019 MCV 83.8 05/31/2019    PLT 75 (L) 05/31/2019     Lab Results   Component Value Date    CREATININE 0.9 05/31/2019    CREATININE 1.0 05/31/2019    BUN 16 05/31/2019    BUN 17 05/31/2019     05/31/2019     05/31/2019    K 4.3 05/31/2019    K 4.4 05/31/2019     05/31/2019     05/31/2019    CO2 27 05/31/2019    CO2 20 (L) 05/31/2019       Hepatic Function Panel:   Lab Results   Component Value Date    ALKPHOS 71 05/29/2019    ALT 15 05/29/2019    AST 18 05/29/2019    PROT 8.4 05/29/2019    BILITOT 0.3 05/29/2019    LABALBU 4.0 05/31/2019       IMAGING:  Chest xray    1. Cardiomegaly   2.  Endotracheal tube and NG tube in place      CT of neck    Right submandibular periglandular fluid, also interspersed in the submental   in sublingual compartments.  Jluis's angina/deep cellulitis is a primary   concern.  Submandibular sialoadenitis is another consideration.      CTA Head   Right MCA bifurcation aneurysm measuring 5 x 5 x 4 mm in size.  The neck   measures 2.6 mm in size.  Comparison with prior imaging may be beneficial to   assess for stability.        Scheduled Meds:   buPROPion  300 mg Oral Daily    omeprazole  20 mg Oral Daily    PARoxetine  30 mg Oral Daily    simvastatin  10 mg Oral Nightly    linagliptin  5 mg Oral Daily    sodium chloride flush  10 mL Intravenous 2 times per day    enoxaparin  40 mg Subcutaneous Daily    ampicillin-sulbactam  3 g Intravenous Q6H    insulin lispro  0-6 Units Subcutaneous TID     insulin lispro  0-3 Units Subcutaneous Nightly    methylPREDNISolone  80 mg Intravenous Daily       Continuous Infusions:   dextrose         PRN Meds:  benzocaine-menthol, sodium chloride flush, magnesium hydroxide, ondansetron, acetaminophen, glucose, dextrose, glucagon (rDNA), dextrose      Assessment:     59 yo female with DMII and HTN who was transferred to [facility] ICU on 5/29/2019 from [facility] for right submandibular space swelling from sudden onset of tongue swelling and shortness of breath. CT neck reveals right submandibular periglandular fluid with Jluis's angina. Currently treating with unasyn and methylprednisolone. WBC 3.8. ESR 92,  CRP 4.9. Possible biopsy of mouth lesion per ENT. Patient extubated 5/30.   -BC NGTD    CT reviewed with radiologist     Plan:     -Continue with unasyn empirically   -Will f/u on ENT 61 Hieu Burden DPM   PGY-2, Pager #: [phone]    Addendum to Resident Progress note:  Pt seen, examined and evaluated. I have reviewed the current history, physical findings, labs and assessment and plan and agree with note as documented by resident (Dr. Anatoliy Jordan).    59 yo woman with DM, HTN, anxiety, R MCA aneurysm     Present to [facility] ED with 'tongue swelling', difficulty swallowing. Pt unable to provide history. Brother is at bedside and reports abrupt onset of SOB, tongue swelling on day of admission. Unknown is she had neck pain prior to this. She had dental work approx 1 - 1.5 mo upper left jaw.       Ad [facility] ED 5/29 - Afebrile, WBC 2.7. CT with R submandibular fluid.   Intubated and transferred to [facility] to [facility] ICU 5/29, on vent, started on steroids, unasyn  Seen by ENT, no surg indicated    Extubated 5/30, had swelling / induration submental (tender with palpation)     IMP/  Acute onset upper airway obstruction - 'tongue swelling' / elevation  Probable neck deep space infection, 'Jluis's angina' - submental induration, had inflammatory changes on CT     REC/  Continue unasyn   Steroid per ENT, Pul / CC    At discharge, can use ertapenem (once a day, broad coverage for oral jermaine, anaerobes), need to give 1st dose in hosp prior to discharge  PICC  See [address]    Discussed with pt  Susan Joyce MD     INFUSION ORDERS:  Invanz 1 gm iv daily through 6/12  - First dose given in hospital  - Disposition / date discharge -   - Check CBC w diff, CMP, ESR, CRP every Mon or Tue - FAX result to [phone]  - Call with

## 2019-05-31 NOTE — PROGRESS NOTES
Hospitalist ICU Progress Note    Date of Admission: 5/29/2019    Chief Complaint: neck swelling    Hospital Course: Pt admitted to ICU for angioedema     Subjective: pt doing well this am, eating without difficulty    Exam:    /83   Pulse 79   Temp 97.8 °F (36.6 °C) (Oral)   Resp 18   Ht 5' 4\" (1.626 m)   Wt 177 lb 11.1 oz (80.6 kg)   SpO2 93%   BMI 30.50 kg/m²     General appearance: No apparent distress, pt appears ill. HEENT: Pupils equal, round, and reactive to light. Conjunctivae/corneas clear. Neck: Supple, with full range of motion. No jugular venous distention. Trachea midline. Respiratory:  Clear to auscultation, bilaterally without RALES/WHEEZES/Rhonchi. Cardiovascular: Regular rate and rhythm with normal S1/S2 without MURMURS, rubs or gallops. Abdomen: Soft, non-tender, non-distended with normal bowel sounds. Musculoskeletal: No clubbing, cyanosis or EDEMA bilaterally. Full range of motion without deformity. Skin: Skin color, texture, turgor normal.  No rashes or lesions. Neurologic:  Neurovascularly intact without any focal sensory/motor deficits. Cranial nerves: II-XII intact, grossly non-focal.  Psychiatric: Alert and oriented, thought content appropriate, normal insight  Capillary Refill: Brisk,< 3 seconds   Peripheral Pulses: +2 palpable, equal bilaterally     Assessment/Plan:    Acute Medical Issues Being Addressed:  Angioedema- pt extubated successfully - ENT follows- ID too. Resolved Medical Issues during this hospital stay:  n/a    Chronic Stable Medical Issue --> cont. home regimen as appropriate or otherwise noted:  DM2  HTN  HLD  obesity    Code Status: Full Code    Hospitalist will assume care once patient is ready for transfer to general wards.     Morena Castillo MD

## 2019-06-01 LAB
ANION GAP SERPL CALCULATED.3IONS-SCNC: 11 MMOL/L (ref 3–16)
BUN BLDV-MCNC: 18 MG/DL (ref 7–20)
CALCIUM SERPL-MCNC: 9.1 MG/DL (ref 8.3–10.6)
CHLORIDE BLD-SCNC: 105 MMOL/L (ref 99–110)
CO2: 26 MMOL/L (ref 21–32)
CREAT SERPL-MCNC: 0.7 MG/DL (ref 0.6–1.2)
GFR AFRICAN AMERICAN: >60
GFR NON-AFRICAN AMERICAN: >60
GLUCOSE BLD-MCNC: 127 MG/DL (ref 70–99)
GLUCOSE BLD-MCNC: 130 MG/DL (ref 70–99)
GLUCOSE BLD-MCNC: 226 MG/DL (ref 70–99)
GLUCOSE BLD-MCNC: 344 MG/DL (ref 70–99)
GLUCOSE BLD-MCNC: 352 MG/DL (ref 70–99)
GLUCOSE BLD-MCNC: 447 MG/DL (ref 70–99)
PERFORMED ON: ABNORMAL
POTASSIUM REFLEX MAGNESIUM: 4.2 MMOL/L (ref 3.5–5.1)
SODIUM BLD-SCNC: 142 MMOL/L (ref 136–145)

## 2019-06-01 PROCEDURE — 6370000000 HC RX 637 (ALT 250 FOR IP): Performed by: STUDENT IN AN ORGANIZED HEALTH CARE EDUCATION/TRAINING PROGRAM

## 2019-06-01 PROCEDURE — 1200000000 HC SEMI PRIVATE

## 2019-06-01 PROCEDURE — 2580000003 HC RX 258: Performed by: STUDENT IN AN ORGANIZED HEALTH CARE EDUCATION/TRAINING PROGRAM

## 2019-06-01 PROCEDURE — 6360000002 HC RX W HCPCS: Performed by: STUDENT IN AN ORGANIZED HEALTH CARE EDUCATION/TRAINING PROGRAM

## 2019-06-01 PROCEDURE — 36415 COLL VENOUS BLD VENIPUNCTURE: CPT

## 2019-06-01 PROCEDURE — 80048 BASIC METABOLIC PNL TOTAL CA: CPT

## 2019-06-01 RX ORDER — NICOTINE POLACRILEX 4 MG
15 LOZENGE BUCCAL PRN
Status: DISCONTINUED | OUTPATIENT
Start: 2019-06-01 | End: 2019-06-03 | Stop reason: HOSPADM

## 2019-06-01 RX ORDER — DEXTROSE MONOHYDRATE 50 MG/ML
100 INJECTION, SOLUTION INTRAVENOUS PRN
Status: DISCONTINUED | OUTPATIENT
Start: 2019-06-01 | End: 2019-06-03 | Stop reason: HOSPADM

## 2019-06-01 RX ORDER — DEXTROSE MONOHYDRATE 25 G/50ML
12.5 INJECTION, SOLUTION INTRAVENOUS PRN
Status: DISCONTINUED | OUTPATIENT
Start: 2019-06-01 | End: 2019-06-03 | Stop reason: HOSPADM

## 2019-06-01 RX ADMIN — INSULIN LISPRO 4 UNITS: 100 INJECTION, SOLUTION INTRAVENOUS; SUBCUTANEOUS at 21:26

## 2019-06-01 RX ADMIN — AMPICILLIN SODIUM AND SULBACTAM SODIUM 3 G: 2; 1 INJECTION, POWDER, FOR SOLUTION INTRAMUSCULAR; INTRAVENOUS at 11:22

## 2019-06-01 RX ADMIN — INSULIN LISPRO 12 UNITS: 100 INJECTION, SOLUTION INTRAVENOUS; SUBCUTANEOUS at 17:33

## 2019-06-01 RX ADMIN — INSULIN LISPRO 2 UNITS: 100 INJECTION, SOLUTION INTRAVENOUS; SUBCUTANEOUS at 12:40

## 2019-06-01 RX ADMIN — AMPICILLIN SODIUM AND SULBACTAM SODIUM 3 G: 2; 1 INJECTION, POWDER, FOR SOLUTION INTRAMUSCULAR; INTRAVENOUS at 06:53

## 2019-06-01 RX ADMIN — METHYLPREDNISOLONE SODIUM SUCCINATE 80 MG: 125 INJECTION, POWDER, FOR SOLUTION INTRAMUSCULAR; INTRAVENOUS at 08:38

## 2019-06-01 RX ADMIN — AMPICILLIN SODIUM AND SULBACTAM SODIUM 3 G: 2; 1 INJECTION, POWDER, FOR SOLUTION INTRAMUSCULAR; INTRAVENOUS at 17:33

## 2019-06-01 RX ADMIN — AMPICILLIN SODIUM AND SULBACTAM SODIUM 3 G: 2; 1 INJECTION, POWDER, FOR SOLUTION INTRAMUSCULAR; INTRAVENOUS at 23:47

## 2019-06-01 RX ADMIN — SIMVASTATIN 10 MG: 10 TABLET, FILM COATED ORAL at 21:30

## 2019-06-01 RX ADMIN — Medication 10 ML: at 21:31

## 2019-06-01 RX ADMIN — ENOXAPARIN SODIUM 40 MG: 40 INJECTION SUBCUTANEOUS at 08:38

## 2019-06-01 RX ADMIN — BUPROPION HYDROCHLORIDE 300 MG: 75 TABLET, FILM COATED ORAL at 08:39

## 2019-06-01 RX ADMIN — LINAGLIPTIN 5 MG: 5 TABLET, FILM COATED ORAL at 08:39

## 2019-06-01 RX ADMIN — VALSARTAN 20 MG: 80 TABLET, FILM COATED ORAL at 08:39

## 2019-06-01 RX ADMIN — PAROXETINE HYDROCHLORIDE 30 MG: 20 TABLET, FILM COATED ORAL at 08:39

## 2019-06-01 RX ADMIN — Medication 10 ML: at 08:46

## 2019-06-01 RX ADMIN — OMEPRAZOLE 20 MG: 20 CAPSULE, DELAYED RELEASE ORAL at 08:39

## 2019-06-01 RX ADMIN — AMPICILLIN SODIUM AND SULBACTAM SODIUM 3 G: 2; 1 INJECTION, POWDER, FOR SOLUTION INTRAMUSCULAR; INTRAVENOUS at 01:12

## 2019-06-01 ASSESSMENT — PAIN SCALES - GENERAL
PAINLEVEL_OUTOF10: 0

## 2019-06-01 NOTE — PLAN OF CARE
Problem: Falls - Risk of:  Goal: Will remain free from falls  Description  Will remain free from falls  6/1/2019 1002 by Kaylynn Jasmine RN  Outcome: Ongoing  Note:   Pt remains free from falls, bed alarm on for safety. Pt has non-skid yellow socks on and uses call light when assistance is needed. Problem: Risk for Impaired Skin Integrity  Goal: Tissue integrity - skin and mucous membranes  Description  Structural intactness and normal physiological function of skin and  mucous membranes. Outcome: Ongoing  Note:   Pt is at risk for impaired skin integrity. Pt encouraged to turn frequently for pressure ulcer prevention. Pt verbalized understanding and demonstrated teaching. Provided patient with pillows to prop herself off her coccyx.

## 2019-06-01 NOTE — PROGRESS NOTES
Progress Note  PGY-3    Hospital Day: 4                                                           Code:Full Code  Admit Date: 5/29/2019                                             PCP: MARSHA Caraballo - FÉLIX                                SUBJECTIVE:   Interval Hx: Pt seen and examined at the bedside. Doing well. No pain no dysphagia, no SOB. No additional complaints. See sitting on the side of the bed eating lunch. MEDICATIONS:   Scheduled Meds:   insulin lispro  0-3 Units Subcutaneous Nightly    lidocaine 1 % injection  5 mL Intradermal Once    sodium chloride flush  10 mL Intravenous 2 times per day    valsartan  20 mg Oral Daily    buPROPion  300 mg Oral Daily    omeprazole  20 mg Oral Daily    PARoxetine  30 mg Oral Daily    simvastatin  10 mg Oral Nightly    linagliptin  5 mg Oral Daily    enoxaparin  40 mg Subcutaneous Daily    ampicillin-sulbactam  3 g Intravenous Q6H    insulin lispro  0-6 Units Subcutaneous TID WC    insulin lispro  0-3 Units Subcutaneous Nightly    methylPREDNISolone  80 mg Intravenous Daily      Continuous Infusions:   dextrose      dextrose       PRN Meds:glucose, dextrose, glucagon (rDNA), dextrose, sodium chloride flush, benzocaine-menthol, magnesium hydroxide, ondansetron, acetaminophen, glucose, dextrose, glucagon (rDNA), dextrose    Allergies: Allergies   Allergen Reactions    Codeine     Morphine Nausea And Vomiting     Antibiotics:    PHYSICAL EXAM:       Vitals: BP (!) 144/71   Pulse 84   Temp 99.1 °F (37.3 °C) (Oral)   Resp 18   Ht 5' 4\" (1.626 m)   Wt 177 lb 11.1 oz (80.6 kg)   SpO2 92%   BMI 30.50 kg/m²     I/O:      Intake/Output Summary (Last 24 hours) at 6/1/2019 1442  Last data filed at 6/1/2019 1233  Gross per 24 hour   Intake 720 ml   Output --   Net 720 ml     I/O this shift: In: 480 [P.O.:480]  Out: -   I/O last 3 completed shifts:   In: 0 [P.O.:590]  Out: -     Physical Examination:   · General appearance: alert, appears stated age and cooperative  · Skin: Skin color, texture, turgor normal.   · HEENT: some fullness of the right side of neck/submandibular are but no pain, some swelling on the left side of mouth and plaque on tongue  · Lungs: clear to auscultation bilaterally  · Heart: regular rate and rhythm, S1, S2 normal, no murmur, click, rub or gallop  · Abdomen: soft, non-tender; bowel sounds normal; no masses,  no organomegaly  · MSK: extremities normal, atraumatic, no cyanosis or edema  · Neurologic:  Mental status: Alert, oriented, thought content appropriate  · Lines:   DATA:       Labs:  CBC:   Recent Labs     05/29/19  1513 05/30/19  0602 05/31/19  0438   WBC 2.7* 3.4* 3.8*   HGB 11.4* 10.5* 10.3*   HCT 34.7* 32.7* 32.2*   PLT 79* 88* 75*       BMP:   Recent Labs     05/30/19  0602 05/31/19  0438 06/01/19  0647    142  139 142   K 4.3 4.3  4.4 4.2    104  105 105   CO2 25 27  20* 26   BUN 20 16  17 18   CREATININE 0.7 0.9  1.0 0.7   GLUCOSE 113* 144*  124* 127*     LFT's:   Recent Labs     05/29/19  1513   AST 18   ALT 15   BILITOT 0.3   ALKPHOS 71     Troponin: No results for input(s): TROPONINI in the last 72 hours. BNP: No results for input(s): BNP in the last 72 hours. ABGs:   Recent Labs     05/30/19  0400 05/30/19  0610   PHART 7.465* 7.367   ALQ9XXY 36.0 46.4*   PO2ART 86.9 67.0*     INR:   Recent Labs     05/31/19  1550   INR 1.35*     Lipids: No results for input(s): CHOL, HDL in the last 72 hours. Invalid input(s): LDLCALCU    U/A:  Recent Labs     05/29/19  1701   COLORU Yellow   PHUR 6.0   CLARITYU Clear   SPECGRAV <=1.005   LEUKOCYTESUR Negative   UROBILINOGEN 0.2   BILIRUBINUR Negative   BLOODU Negative   GLUCOSEU Negative        Micro:   ASSESSMENT AND PLAN:   Valdo Villagomez is a 58 y.o. female with PMH of DMII and HTN transferred from [facility] for ENT evaluation for R Bharatinho 55 angina. Briefly, continuing IV Abx and awaiting PICC line placement on Monday. Blood CXs must be negative.

## 2019-06-01 NOTE — DISCHARGE INSTR - COC
Continuity of Care Form    Patient Name: Candice Guillen   :  1955  MRN:  [id number]    Admit date:  2019  Discharge date:  ***    Code Status Order: Full Code   Advance Directives:   Advance Care Flowsheet Documentation     Date/Time Healthcare Directive Type of Healthcare Directive Copy in [address] Agent's Name Healthcare Agent's Phone Number    [phone]  No, patient does not have an advance directive for healthcare treatment -- -- -- -- --          Admitting Physician:  Morenita Garzon MD  PCP: MARSHA Canela CNP    Discharging Nurse: [facility] Unit/Room#: 4416/7556-09  Discharging Unit Phone Number: ***    Emergency Contact:   Extended Emergency Contact Information  Primary Emergency Contact: [facility] [address] Phone: [phone]  Relation: Grandchild  Secondary Emergency Contact: [address] [place] [address] [facility] [place]  Mobile Phone: [phone]  Relation: Child    Past Surgical History:  Past Surgical History:   Procedure Laterality Date    KNEE SURGERY      left    LEG SURGERY      TUBAL LIGATION         Immunization History: There is no immunization history on file for this patient.     Active Problems:  Patient Active Problem List   Diagnosis Code    Tongue swelling R22.0    Neck infection L08.9    Acute respiratory failure with hypoxia (HCC) J96.01    Jluis's angina syndrome K12.2       Isolation/Infection:   Isolation          No Isolation            Nurse Assessment:  Last Vital Signs: BP (!) 144/71   Pulse 78   Temp 99.1 °F (37.3 °C) (Oral)   Resp 18   Ht 5' 4\" (1.626 m)   Wt 177 lb 11.1 oz (80.6 kg)   SpO2 92%   BMI 30.50 kg/m²     Last documented pain score (0-10 scale): Pain Level: 0  Last Weight:   Wt Readings from Last 1 Encounters:   19 177 lb 11.1 oz (80.6 kg)     Mental Status:  {IP PT MENTAL STATUS:54005}    IV Access:  [facility] IV ACCESS:911405281}    Nursing Mobility/ADLs:  Walking   {CHP DME UQWM:304325456}  Transfer  {CHP DME TZXW:342939518}  Bathing  {CHP DME QNLV:664326124}  Dressing  {CHP DME SCBQ:988653700}  Toileting  {CHP DME FBBO:477035449}  Feeding  {CHP DME ZLDQ:307001825}  Med Admin  {CHP DME DOMF:342601765}  Med Delivery   { ROMY MED Delivery:591425818}    Wound Care Documentation and Therapy:        Elimination:  Continence:   · Bowel: {YES / UD:14979}  · Bladder: {YES / ZD:51484}  Urinary Catheter: {Urinary Catheter:967578846}   Colostomy/Ileostomy/Ileal Conduit: {YES / KI:15647}       Date of Last BM: ***    Intake/Output Summary (Last 24 hours) at 2019 1017  Last data filed at 2019 0823  Gross per 24 hour   Intake 830 ml   Output --   Net 830 ml     I/O last 3 completed shifts:   In: 0 [P.O.:590]  Out: -     Safety Concerns:     [address] Concerns:928913351}    Impairments/Disabilities:      [address] Impairments/Disabilities:258946671}    Nutrition Therapy:  Current Nutrition Therapy:   [address] Diet List:912633478}    Routes of Feeding: {P DME Other Feedings:694771126}  Liquids: {Slp liquid thickness:62126}  Daily Fluid Restriction: {CHP DME Yes amt example:032521855}  Last Modified Barium Swallow with Video (Video Swallowing Test): {Done Not Done ETBO:892588576}    Treatments at the Time of Hospital Discharge:   Respiratory Treatments: ***  Oxygen Therapy:  {Therapy; copd oxygen:12098}  Ventilator:    { CC Vent NSPM:154310508}    Rehab Therapies: {THERAPEUTIC INTERVENTION:5650143800}  Weight Bearing Status/Restrictions: [address]  Weight Bearin}  Other Medical Equipment (for information only, NOT a DME order):  {EQUIPMENT:667624026}  Other Treatments: ***    Patient's personal belongings (please select all that are sent with patient):  {[facility] DME Belongings:100176957}    RN SIGNATURE:  {Esignature:055881036}    CASE MANAGEMENT/SOCIAL WORK SECTION    Inpatient Status Date: ***    Readmission Risk Assessment Score:  Readmission Risk              Risk of Unplanned Readmission:        15           Discharging to Facility/ [address] [facility] [address]       Phone: [phone]       Fax: [phone]        [address] [address]       Phone: [phone]       Fax: [phone]            Dialysis Facility (if applicable)  NA  · Name:  · Address:  · Dialysis Schedule:  · Phone:  · Fax:    / signature: Electronically signed by Beatriz Bloom RN on 6/3/19 at 10:53 AM    PHYSICIAN SECTION    Prognosis: Fair    Condition at Discharge: Stable    Rehab Potential (if transferring to Rehab): Good    Recommended Labs or Other Treatments After Discharge:    Invanz 1 gm iv daily through 6/12  - First dose given in hospital 6/3  - Disposition / date discharge - home / 6/3  - Check CBC w diff, CMP, ESR, CRP every Mon or [address] result to [phone]  - Call with antibiotic / infusion issues, [phone]  - No f/u in outpatient ID office necessary    Physician Certification: I certify the above information and transfer of Arley Kinney  is necessary for the continuing treatment of the diagnosis listed and that she requires Home Care for greater 30 days.      Update Admission H&P: No change in H&P    PHYSICIAN SIGNATURE:  Electronically signed by Geraldo Duverney, MD on 6/3/19 at 5:12 PM

## 2019-06-01 NOTE — PLAN OF CARE
Problem: Falls - Risk of:  Goal: Will remain free from falls  Outcome: Ongoing  Note:   Pt at risk for falls. Free from falls this shift. Fall risk protocol in place. Bed in low position. Bed alarm and bed brakes in place. Fall risk sign posted . Patient in bed , side rails up x 2 and call light in reach. Will continue to monitor safety during hourly rounding.

## 2019-06-01 NOTE — PROGRESS NOTES
On call resident perfect served about blood sugar of 344. Pt to now have medium correction sliding scale dose of humalog.

## 2019-06-02 LAB
ANION GAP SERPL CALCULATED.3IONS-SCNC: 12 MMOL/L (ref 3–16)
BASOPHILS ABSOLUTE: 0 K/UL (ref 0–0.2)
BASOPHILS RELATIVE PERCENT: 0.2 %
BUN BLDV-MCNC: 20 MG/DL (ref 7–20)
CALCIUM SERPL-MCNC: 9.2 MG/DL (ref 8.3–10.6)
CHLORIDE BLD-SCNC: 104 MMOL/L (ref 99–110)
CO2: 26 MMOL/L (ref 21–32)
CREAT SERPL-MCNC: 0.8 MG/DL (ref 0.6–1.2)
EOSINOPHILS ABSOLUTE: 0 K/UL (ref 0–0.6)
EOSINOPHILS RELATIVE PERCENT: 0.4 %
GFR AFRICAN AMERICAN: >60
GFR NON-AFRICAN AMERICAN: >60
GLUCOSE BLD-MCNC: 134 MG/DL (ref 70–99)
GLUCOSE BLD-MCNC: 142 MG/DL (ref 70–99)
GLUCOSE BLD-MCNC: 226 MG/DL (ref 70–99)
GLUCOSE BLD-MCNC: 300 MG/DL (ref 70–99)
GLUCOSE BLD-MCNC: 320 MG/DL (ref 70–99)
HCT VFR BLD CALC: 30.5 % (ref 36–48)
HEMOGLOBIN: 9.8 G/DL (ref 12–16)
LYMPHOCYTES ABSOLUTE: 1.1 K/UL (ref 1–5.1)
LYMPHOCYTES RELATIVE PERCENT: 33 %
MCH RBC QN AUTO: 26.7 PG (ref 26–34)
MCHC RBC AUTO-ENTMCNC: 32 G/DL (ref 31–36)
MCV RBC AUTO: 83.2 FL (ref 80–100)
MONOCYTES ABSOLUTE: 0.4 K/UL (ref 0–1.3)
MONOCYTES RELATIVE PERCENT: 12 %
NEUTROPHILS ABSOLUTE: 1.9 K/UL (ref 1.7–7.7)
NEUTROPHILS RELATIVE PERCENT: 54.4 %
PDW BLD-RTO: 15.2 % (ref 12.4–15.4)
PERFORMED ON: ABNORMAL
PLATELET # BLD: 82 K/UL (ref 135–450)
PMV BLD AUTO: 7.4 FL (ref 5–10.5)
POTASSIUM REFLEX MAGNESIUM: 3.9 MMOL/L (ref 3.5–5.1)
RBC # BLD: 3.67 M/UL (ref 4–5.2)
SODIUM BLD-SCNC: 142 MMOL/L (ref 136–145)
WBC # BLD: 3.4 K/UL (ref 4–11)

## 2019-06-02 PROCEDURE — 2580000003 HC RX 258: Performed by: STUDENT IN AN ORGANIZED HEALTH CARE EDUCATION/TRAINING PROGRAM

## 2019-06-02 PROCEDURE — 80048 BASIC METABOLIC PNL TOTAL CA: CPT

## 2019-06-02 PROCEDURE — 6370000000 HC RX 637 (ALT 250 FOR IP): Performed by: STUDENT IN AN ORGANIZED HEALTH CARE EDUCATION/TRAINING PROGRAM

## 2019-06-02 PROCEDURE — 6360000002 HC RX W HCPCS: Performed by: STUDENT IN AN ORGANIZED HEALTH CARE EDUCATION/TRAINING PROGRAM

## 2019-06-02 PROCEDURE — 1200000000 HC SEMI PRIVATE

## 2019-06-02 PROCEDURE — 85025 COMPLETE CBC W/AUTO DIFF WBC: CPT

## 2019-06-02 PROCEDURE — 36415 COLL VENOUS BLD VENIPUNCTURE: CPT

## 2019-06-02 PROCEDURE — 99232 SBSQ HOSP IP/OBS MODERATE 35: CPT | Performed by: INTERNAL MEDICINE

## 2019-06-02 RX ADMIN — BUPROPION HYDROCHLORIDE 300 MG: 75 TABLET, FILM COATED ORAL at 08:06

## 2019-06-02 RX ADMIN — INSULIN LISPRO 12 UNITS: 100 INJECTION, SOLUTION INTRAVENOUS; SUBCUTANEOUS at 17:47

## 2019-06-02 RX ADMIN — AMPICILLIN SODIUM AND SULBACTAM SODIUM 3 G: 2; 1 INJECTION, POWDER, FOR SOLUTION INTRAMUSCULAR; INTRAVENOUS at 12:05

## 2019-06-02 RX ADMIN — INSULIN LISPRO 6 UNITS: 100 INJECTION, SOLUTION INTRAVENOUS; SUBCUTANEOUS at 21:07

## 2019-06-02 RX ADMIN — Medication 10 ML: at 19:59

## 2019-06-02 RX ADMIN — LINAGLIPTIN 5 MG: 5 TABLET, FILM COATED ORAL at 08:09

## 2019-06-02 RX ADMIN — AMPICILLIN SODIUM AND SULBACTAM SODIUM 3 G: 2; 1 INJECTION, POWDER, FOR SOLUTION INTRAMUSCULAR; INTRAVENOUS at 06:16

## 2019-06-02 RX ADMIN — ENOXAPARIN SODIUM 40 MG: 40 INJECTION SUBCUTANEOUS at 08:06

## 2019-06-02 RX ADMIN — AMPICILLIN SODIUM AND SULBACTAM SODIUM 3 G: 2; 1 INJECTION, POWDER, FOR SOLUTION INTRAMUSCULAR; INTRAVENOUS at 17:48

## 2019-06-02 RX ADMIN — PAROXETINE HYDROCHLORIDE 30 MG: 20 TABLET, FILM COATED ORAL at 08:05

## 2019-06-02 RX ADMIN — INSULIN LISPRO 6 UNITS: 100 INJECTION, SOLUTION INTRAVENOUS; SUBCUTANEOUS at 12:15

## 2019-06-02 RX ADMIN — OMEPRAZOLE 20 MG: 20 CAPSULE, DELAYED RELEASE ORAL at 08:06

## 2019-06-02 RX ADMIN — SIMVASTATIN 10 MG: 10 TABLET, FILM COATED ORAL at 19:58

## 2019-06-02 RX ADMIN — METHYLPREDNISOLONE SODIUM SUCCINATE 80 MG: 125 INJECTION, POWDER, FOR SOLUTION INTRAMUSCULAR; INTRAVENOUS at 08:05

## 2019-06-02 ASSESSMENT — PAIN SCALES - GENERAL: PAINLEVEL_OUTOF10: 0

## 2019-06-02 NOTE — PROGRESS NOTES
ID Follow-up NOTE    CC:   Neck infection  Antibiotics: Unasyn    Admit Date: 2019  Hospital Day: 5    Subjective:     Patient reports she is better, less tongue and neck swelling      Objective:     Patient Vitals for the past 8 hrs:   BP Temp Temp src Pulse Resp SpO2   19 0757 108/62 97.2 °F (36.2 °C) Oral 62 16 93 %   19 0608 (!) 117/59 97.7 °F (36.5 °C) Oral 61 16 93 %     I/O last 3 completed shifts: In: 720 [P.O.:720]  Out: -   I/O this shift:  In: 240 [P.O.:240]  Out: -     EXAM:  GENERAL: No apparent distress. HEENT: Membranes moist, no oral lesion  NECK:  Supple - some submental firmness remains  LUNGS: Clear b/l, no rales, no dullness  CARDIAC: RRR, no murmur appreciated  ABD:  + BS, soft / NT  EXT:  No rash, no edema, no lesions  NEURO: No focal neurologic findings  PSYCH: Orientation, sensorium, mood normal  LINES:  Peripheral iv       Data Review:  Lab Results   Component Value Date    WBC 3.4 (L) 2019    HGB 9.8 (L) 2019    HCT 30.5 (L) 2019    MCV 83.2 2019    PLT 82 (L) 2019     Lab Results   Component Value Date    CREATININE 0.8 2019    BUN 20 2019     2019    K 3.9 2019     2019    CO2 26 2019       Hepatic Function Panel:   Lab Results   Component Value Date    ALKPHOS 71 2019    ALT 15 2019    AST 18 2019    PROT 8.4 2019    BILITOT 0.3 2019    LABALBU 4.0 2019       MICRO:   BC no growth to date    IMAGIN/29 CT of neck    Right submandibular periglandular fluid, also interspersed in the submental   in sublingual compartments.  Jluis's angina/deep cellulitis is a primary   concern.  Submandibular sialoadenitis is another consideration.        Scheduled Meds:   insulin lispro  0-18 Units Subcutaneous TID WC    insulin lispro  0-9 Units Subcutaneous Nightly    lidocaine 1 % injection  5 mL Intradermal Once    sodium chloride flush  10 mL Intravenous 2 times per day    valsartan  20 mg Oral Daily    buPROPion  300 mg Oral Daily    omeprazole  20 mg Oral Daily    PARoxetine  30 mg Oral Daily    simvastatin  10 mg Oral Nightly    linagliptin  5 mg Oral Daily    enoxaparin  40 mg Subcutaneous Daily    ampicillin-sulbactam  3 g Intravenous Q6H    methylPREDNISolone  80 mg Intravenous Daily       Continuous Infusions:   dextrose      dextrose         PRN Meds:  glucose, dextrose, glucagon (rDNA), dextrose, sodium chloride flush, benzocaine-menthol, magnesium hydroxide, ondansetron, acetaminophen, glucose, dextrose, glucagon (rDNA), dextrose      Assessment:     57 yo woman with DM, HTN, anxiety, R MCA aneurysm     Present to [facility] ED with 'tongue swelling', difficulty swallowing.   Pt unable to provide history. Claudia Bray is at bedside and reports abrupt onset of SOB, tongue swelling on day of admission.  Unknown is she had neck pain prior to this.  She had dental work approx 1 - 1.5 mo upper left jaw.       Ad [facility] ED 5/29 -Shilo Garnett, WBC 2.7.  CT with R submandibular fluid.  Intubated and transferred to [facility] to  ICU 5/29, on vent, started on steroids, unasyn  Seen by ENT, no surg indicated     Extubated 5/30, had swelling / induration submental (tender with palpation)     IMP/  Acute onset upper airway obstruction - 'tongue swelling' / elevation  Probable neck deep space infection, 'Jluis's angina' - submental induration, had inflammatory changes on CT    Plan:     Continue unasyn   Steroid per ENT, Pul / CC - being tapered     At discharge, can use ertapenem (once a day, broad coverage for oral jermaine, anaerobes), need to give 1st dose in hosp prior to discharge     PICC  See Simone Borja     Discussed with pt  Debbie Arias MD     INFUSION ORDERS:  Invanz 1 gm iv daily through 6/12  - First dose given in hospital 6/3  - Disposition / date discharge - home / Joycelyn Roblero 6/3  - Check CBC w diff, CMP, ESR, CRP every Mon or Tue - FAX result to [phone]  - Call with antibiotic / infusion issues, [phone]  - No f/u in outpatient ID office necessary

## 2019-06-02 NOTE — PLAN OF CARE
Problem: Falls - Risk of:  Goal: Will remain free from falls  Description  Will remain free from falls  Outcome: Ongoing  Note:   Pt is up ad cailin, wearing non skid socks and steady when ambulating

## 2019-06-02 NOTE — PLAN OF CARE
Problem: Risk for Impaired Skin Integrity  Goal: Tissue integrity - skin and mucous membranes  Outcome: Ongoing  Note:   Skin intact. No skin breakdown noted during shift. Patient able to make frequent turns. Will continue to monitor. Problem: Falls - Risk of:  Goal: Will remain free from falls  Note:   See martinez score. Patient ambulates with steady gait. Non skid slippers on. Instruct patient to call with additional needs. Patient verbalized understanding. Bed in low position and brakes or on. Will continue to monitor.

## 2019-06-02 NOTE — PROGRESS NOTES
has no wheezes. Abdominal: Soft. Bowel sounds are normal. She exhibits no distension. There is no tenderness. Musculoskeletal: Normal range of motion. She exhibits no edema or deformity. Neurological: She is alert and oriented to person, place, and time. Labs:   Recent Labs     05/31/19  0438 06/02/19  0638   WBC 3.8* 3.4*   HGB 10.3* 9.8*   HCT 32.2* 30.5*   PLT 75* 82*     Recent Labs     05/31/19  0438 06/01/19  0647 06/02/19  0638     139 142 142   K 4.3  4.4 4.2 3.9     105 105 104   CO2 27  20* 26 26   BUN 16  17 18 20   CREATININE 0.9  1.0 0.7 0.8   CALCIUM 9.0  8.5 9.1 9.2   PHOS 2.8  --   --      No results for input(s): AST, ALT, BILIDIR, BILITOT, ALKPHOS in the last 72 hours. Recent Labs     05/31/19  1550   INR 1.35*     No results for input(s): Ellender Lee in the last 72 hours.     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]     Tongue swelling [R22.0] 05/29/2019     Right submandibular space swelling 2/2 suspected Jluis's angina -  (periodontal infection as suspected source and/or mouth cancer), patient reported she had root canal of L upper cuspid for tooth abscess one month ago  - CT scan as above consistent with Jluis's angina  - appreciate ENT recs - no intervention at this time  - ID following recs appreciated   -Ampicillin-sulbactam 3g IV q6h, continue abx therapy for 2-3 weeks  - continue Methylprednisolone 80 mg qdaily  - Continue omeprazole  - peridex mouthwash  - will need follow-up with dentist outpatient for periodontal abscess/gingivitis     DM2   - continue home Januvia   - Low-dose sliding scale insulin   - Hypoglycemia protocol      HTN   -Valsartan 20mg qdaily (interchange for candesartan)       HLD  -continue home simvastatin 20 mg     Anxiety  -continue home bupropion, paroxetine     R MCA saccular aneurysm   - last CTA Head(5/28), R MCA bifurcation aneurysm stable   - follows with Dr. Ema Calderon, neurology     DVT Prophylaxis: lovenox  Diet: DIET CARB CONTROL;  Code Status: Full Code    Dispo - Awaiting PICC line insertion    I will discuss the patient with the senior resident and MD Malia Menezes,    Internal Medicine Resident PGY-1  Reach me via [facility]

## 2019-06-03 VITALS
HEART RATE: 76 BPM | DIASTOLIC BLOOD PRESSURE: 78 MMHG | HEIGHT: 64 IN | TEMPERATURE: 98.5 F | RESPIRATION RATE: 18 BRPM | BODY MASS INDEX: 30.34 KG/M2 | WEIGHT: 177.69 LBS | OXYGEN SATURATION: 90 % | SYSTOLIC BLOOD PRESSURE: 159 MMHG

## 2019-06-03 LAB
ANION GAP SERPL CALCULATED.3IONS-SCNC: 12 MMOL/L (ref 3–16)
BASOPHILS ABSOLUTE: 0 K/UL (ref 0–0.2)
BASOPHILS RELATIVE PERCENT: 0.3 %
BUN BLDV-MCNC: 20 MG/DL (ref 7–20)
CALCIUM SERPL-MCNC: 9.2 MG/DL (ref 8.3–10.6)
CHLORIDE BLD-SCNC: 102 MMOL/L (ref 99–110)
CO2: 25 MMOL/L (ref 21–32)
CREAT SERPL-MCNC: 0.8 MG/DL (ref 0.6–1.2)
EOSINOPHILS ABSOLUTE: 0 K/UL (ref 0–0.6)
EOSINOPHILS RELATIVE PERCENT: 0.6 %
GFR AFRICAN AMERICAN: >60
GFR NON-AFRICAN AMERICAN: >60
GLUCOSE BLD-MCNC: 219 MG/DL (ref 70–99)
GLUCOSE BLD-MCNC: 226 MG/DL (ref 70–99)
GLUCOSE BLD-MCNC: 233 MG/DL (ref 70–99)
GLUCOSE BLD-MCNC: 383 MG/DL (ref 70–99)
HCT VFR BLD CALC: 30.9 % (ref 36–48)
HEMOGLOBIN: 10 G/DL (ref 12–16)
LYMPHOCYTES ABSOLUTE: 1 K/UL (ref 1–5.1)
LYMPHOCYTES RELATIVE PERCENT: 33.9 %
MCH RBC QN AUTO: 27 PG (ref 26–34)
MCHC RBC AUTO-ENTMCNC: 32.3 G/DL (ref 31–36)
MCV RBC AUTO: 83.7 FL (ref 80–100)
MONOCYTES ABSOLUTE: 0.3 K/UL (ref 0–1.3)
MONOCYTES RELATIVE PERCENT: 11 %
NEUTROPHILS ABSOLUTE: 1.7 K/UL (ref 1.7–7.7)
NEUTROPHILS RELATIVE PERCENT: 54.2 %
PDW BLD-RTO: 15.6 % (ref 12.4–15.4)
PERFORMED ON: ABNORMAL
PLATELET # BLD: 85 K/UL (ref 135–450)
PMV BLD AUTO: 7.4 FL (ref 5–10.5)
POTASSIUM REFLEX MAGNESIUM: 4 MMOL/L (ref 3.5–5.1)
RBC # BLD: 3.69 M/UL (ref 4–5.2)
SODIUM BLD-SCNC: 139 MMOL/L (ref 136–145)
WBC # BLD: 3.1 K/UL (ref 4–11)

## 2019-06-03 PROCEDURE — C1751 CATH, INF, PER/CENT/MIDLINE: HCPCS

## 2019-06-03 PROCEDURE — 6370000000 HC RX 637 (ALT 250 FOR IP): Performed by: STUDENT IN AN ORGANIZED HEALTH CARE EDUCATION/TRAINING PROGRAM

## 2019-06-03 PROCEDURE — 6360000002 HC RX W HCPCS: Performed by: STUDENT IN AN ORGANIZED HEALTH CARE EDUCATION/TRAINING PROGRAM

## 2019-06-03 PROCEDURE — 36569 INSJ PICC 5 YR+ W/O IMAGING: CPT

## 2019-06-03 PROCEDURE — 99232 SBSQ HOSP IP/OBS MODERATE 35: CPT | Performed by: OTOLARYNGOLOGY

## 2019-06-03 PROCEDURE — 2580000003 HC RX 258: Performed by: STUDENT IN AN ORGANIZED HEALTH CARE EDUCATION/TRAINING PROGRAM

## 2019-06-03 PROCEDURE — 2580000003 HC RX 258: Performed by: INTERNAL MEDICINE

## 2019-06-03 PROCEDURE — 80048 BASIC METABOLIC PNL TOTAL CA: CPT

## 2019-06-03 PROCEDURE — 85025 COMPLETE CBC W/AUTO DIFF WBC: CPT

## 2019-06-03 PROCEDURE — 6360000002 HC RX W HCPCS: Performed by: INTERNAL MEDICINE

## 2019-06-03 PROCEDURE — 36415 COLL VENOUS BLD VENIPUNCTURE: CPT

## 2019-06-03 PROCEDURE — 99232 SBSQ HOSP IP/OBS MODERATE 35: CPT | Performed by: INTERNAL MEDICINE

## 2019-06-03 PROCEDURE — 02HV33Z INSERTION OF INFUSION DEVICE INTO SUPERIOR VENA CAVA, PERCUTANEOUS APPROACH: ICD-10-PCS | Performed by: INTERNAL MEDICINE

## 2019-06-03 RX ORDER — PREDNISONE 20 MG/1
TABLET ORAL
Qty: 12 TABLET | Refills: 0 | Status: SHIPPED | OUTPATIENT
Start: 2019-06-03 | End: 2019-06-09

## 2019-06-03 RX ADMIN — INSULIN LISPRO 6 UNITS: 100 INJECTION, SOLUTION INTRAVENOUS; SUBCUTANEOUS at 08:51

## 2019-06-03 RX ADMIN — SODIUM CHLORIDE 1000 MG: 900 INJECTION INTRAVENOUS at 18:03

## 2019-06-03 RX ADMIN — INSULIN LISPRO 6 UNITS: 100 INJECTION, SOLUTION INTRAVENOUS; SUBCUTANEOUS at 13:13

## 2019-06-03 RX ADMIN — LINAGLIPTIN 5 MG: 5 TABLET, FILM COATED ORAL at 08:48

## 2019-06-03 RX ADMIN — AMPICILLIN SODIUM AND SULBACTAM SODIUM 3 G: 2; 1 INJECTION, POWDER, FOR SOLUTION INTRAMUSCULAR; INTRAVENOUS at 06:13

## 2019-06-03 RX ADMIN — AMPICILLIN SODIUM AND SULBACTAM SODIUM 3 G: 2; 1 INJECTION, POWDER, FOR SOLUTION INTRAMUSCULAR; INTRAVENOUS at 00:24

## 2019-06-03 RX ADMIN — ENOXAPARIN SODIUM 40 MG: 40 INJECTION SUBCUTANEOUS at 08:48

## 2019-06-03 RX ADMIN — BUPROPION HYDROCHLORIDE 300 MG: 75 TABLET, FILM COATED ORAL at 08:47

## 2019-06-03 RX ADMIN — METHYLPREDNISOLONE SODIUM SUCCINATE 80 MG: 125 INJECTION, POWDER, FOR SOLUTION INTRAMUSCULAR; INTRAVENOUS at 08:48

## 2019-06-03 RX ADMIN — PAROXETINE HYDROCHLORIDE 30 MG: 20 TABLET, FILM COATED ORAL at 08:47

## 2019-06-03 RX ADMIN — AMPICILLIN SODIUM AND SULBACTAM SODIUM 3 G: 2; 1 INJECTION, POWDER, FOR SOLUTION INTRAMUSCULAR; INTRAVENOUS at 13:08

## 2019-06-03 RX ADMIN — VALSARTAN 20 MG: 80 TABLET, FILM COATED ORAL at 08:47

## 2019-06-03 RX ADMIN — Medication 10 ML: at 08:48

## 2019-06-03 RX ADMIN — INSULIN LISPRO 15 UNITS: 100 INJECTION, SOLUTION INTRAVENOUS; SUBCUTANEOUS at 18:28

## 2019-06-03 RX ADMIN — OMEPRAZOLE 20 MG: 20 CAPSULE, DELAYED RELEASE ORAL at 08:47

## 2019-06-03 NOTE — PROGRESS NOTES
Nutrition Assessment    Type and Reason for Visit: Reassess    Nutrition Recommendations:     1. CC4 diet, monitor and encourage intake  2. Monitor po adequacy and need for ONS    Nutrition Assessment: Patients nutritional status is improved as she is extubated (as of 5/30) and transferred to floor. Diet advanced to [facility] and patient is tolerating well with good po intake. Will monitor nutritional status and need for ONS. Malnutrition Assessment:  · Malnutrition Status: At risk for malnutrition  · Context: Acute illness or injury  · Findings of the 6 clinical characteristics of malnutrition (Minimum of 2 out of 6 clinical characteristics is required to make the diagnosis of moderate or severe Protein Calorie Malnutrition based on AND/ASPEN Guidelines):  1. Energy Intake-Less than or equal to 50% of estimated energy requirement, (x 1 day)    2. Weight Loss-No significant weight loss,    3. Fat Loss-Unable to assess,    4. Muscle Loss-Unable to assess,    5. Fluid Accumulation-No significant fluid accumulation,    6.   Strength-Not measured    Nutrition Risk Level: High    Nutrient Needs:  · Estimated Daily Total Kcal: 8945-4239 (15-18)  · Estimated Daily Protein (g): (1.8-2.0)  · Estimated Daily Total Fluid (ml/day): 1500 ml min     Nutrition Diagnosis:   · Problem: Inadequate oral intake  · Etiology: related to Acute injury/trauma     Signs and symptoms:  as evidenced by NPO status due to medical condition    Objective Information:  · Nutrition-Focused Physical Findings:    · Wound Type: None  · Current Nutrition Therapies:  · Oral Diet Orders: NPO   · Oral Diet intake: NPO  · Oral Nutrition Supplement (ONS) Orders: None  · ONS intake: NPO  · Anthropometric Measures:  · Ht: 5' 4\" (162.6 cm)   · Current Body Wt: 181 lb 3.5 oz (82.2 kg)  · Ideal Body Wt: 120 lb (54.4 kg),    · BMI Classification: BMI 30.0 - 34.9 Obese Class I    Nutrition Interventions:   Continue NPO  Continued Inpatient Monitoring    Nutrition Evaluation:   · Evaluation: Goals set   · Goals: pt will tolerate start of most appropriate form of nutrition     · Monitoring: Nutrition Progression      Electronically signed by Misty Gilbert RD, LD on 6/3/19 at 3:44 PM    Contact Number: [phone]

## 2019-06-03 NOTE — CARE COORDINATION
Critical access hospital    Spoke with patient regarding [facility] services. Patient aware and agreeable to services. Faxed orders to [facility].     Becky Gordon LPN  Care Transition Nurse  [address]  [phone]

## 2019-06-03 NOTE — CARE COORDINATION
Case Management Discharge Assessment    6/3/2019  [facility])  Clinical Case Management Department    Patient: Deneice Pallas  MRN: [id number] / : 1955  ACCT: [de-identified]          Admission Documentation  Attending Provider: Whitman Bloch, MD  Admit date/time: 2019 10:12 PM  Status: Inpatient [101]  Diagnosis: Mouth swelling     Readmission within last 30 days:  no     Living Situation  Discharge Planning  Living Arrangements: Children  Support Systems: Family Members, Children, Friends/Neighbors  Potential Assistance Needed: Skilled Nursing Facility  Type of Home Care Services: None  Patient expects to be discharged to[de-identified] Home  Expected Discharge Date: 19    Service Assessment:       Values / Beliefs  Do you have any ethnic, cultural, sacramental, or spiritual Rastafari needs you would like us to be aware of while you are in the hospital?: No    Advance Directives (For Healthcare)  Healthcare Directive: No, patient does not have an advance directive for healthcare treatment       Pharmacy:  [facility] [address] Medications:  No  Does patient want to participate in local refill/meds to beds program?: No    Notification completed in HENS/PAS? No     IMM  Yes       Discharge disposition: Home     [address] [facility] [address]        Phone: [phone]        Fax: [phone]                    [address] [address]         Phone: [phone]         Fax: [phone]         Mode of Transport Private car      Gloria Arenas and her family were provided with choice of provider; she and her family are in agreement with the discharge plan.       CM  Met  With pt at bedside  To confirm  D/c plan  , she states  The  PICC  RN  Came  In and  Stated  Only needing a  Mid line  And  Would  Confirm and return ,

## 2019-06-03 NOTE — PROCEDURES
MIDLINE   PROCEDURE   NOTE      Chart reviewed for allergies, diagnosis, labs, known contraindications, reason for line placement and planned length of treatment. Insertion procedure discussed with patient/family member. Informed consent not required for Midline placement. Three patient identifiers - Patient name,   and MRN -  completed &  confirmed verbally. Hat, mask and eye shield donned. Midline site scrubbed with Chloraprep  One-Step applicator  for 30 seconds x 1. Hand Hygiene  performed with 3% Chlorhexidine surgical scrub x1 min prior to  Sterile gloves,   sterile gown being donned. Patient draped using maximal sterile barrier technique ( head to toe ). Midline site scrubbed a 2nd time with Chloraprep One-Step applicator x 30 sec. .   Midline PowerGlide Pro 20g, 10cm inserted via  Rt. Brachial    Positive brisk blood return obtained Midline flushed with 10 mls  0.9% Sterile Sodium Chloride. Midline flushes easily with no resistance. Neutral pressure valve placed on all lumens followed by Alcohol Swab Caps on end of each. Skin prep applied to site. Bio-patch in place. Catheter secured with non-sutured locking device per hospital protocol with 0 cm of catheter showing beneath dressing. Sterile Tegaderm applied over Midline site. Sterile field maintained during procedure. Guide wire accounted for post procedure? Y  Pt. Response to procedure Y. Appearance of site: Clean dry and intact without bleeding or edema. All edges of Tegaderm occlusive. Site marked with date and initials of RN placing line. Top 2 side rails in up position, call button in reach, RN notified of all of the above.

## 2019-06-03 NOTE — PROGRESS NOTES
Oral Daily    omeprazole  20 mg Oral Daily    PARoxetine  30 mg Oral Daily    simvastatin  10 mg Oral Nightly    linagliptin  5 mg Oral Daily    enoxaparin  40 mg Subcutaneous Daily    ampicillin-sulbactam  3 g Intravenous Q6H    methylPREDNISolone  80 mg Intravenous Daily       Continuous Infusions:   dextrose      dextrose         PRN Meds:  glucose, dextrose, glucagon (rDNA), dextrose, sodium chloride flush, benzocaine-menthol, magnesium hydroxide, ondansetron, acetaminophen, glucose, dextrose, glucagon (rDNA), dextrose      Assessment:     59 yo woman with DM, HTN, anxiety, R MCA aneurysm     Present to [facility] ED with 'tongue swelling', difficulty swallowing.   Pt unable to provide history. Denise Seat is at bedside and reports abrupt onset of SOB, tongue swelling on day of admission.  Unknown is she had neck pain prior to this.  She had dental work approx 1 - 1.5 mo upper left jaw.       Ad [facility] ED 5/29 -Kaleen Ek, WBC 2.7.  CT with R submandibular fluid.  Intubated and transferred to [facility] to  ICU 5/29, on vent, started on steroids, unasyn  Seen by ENT, no surg indicated     Extubated 5/30, had swelling / induration submental (tender with palpation)     IMP/  Acute onset upper airway obstruction - 'tongue swelling' / elevation  Probable neck deep space infection, 'Jluis's angina'   - submental induration, sx of closed airway   - inflammatory changes pushing tongue superiorly on CT   - will cover polymicrobial oral jermaine    Plan:     Continue unasyn   Steroid per ENT, Pul / CC - being tapered     Ordered ertapenem - 1st dose in hosp prior to discharge     See ROMY     Discussed with pt  Leeann Chang MD     INFUSION ORDERS:  Invanz 1 gm iv daily through 6/12  - First dose given in hospital 6/3  - Disposition / date discharge - home / 6/3  - Check CBC w diff, CMP, ESR, CRP every Mon or Tue - FAX result to [phone]  - Call with antibiotic / infusion issues, [phone]  - No f/u in outpatient

## 2019-06-03 NOTE — PLAN OF CARE
Problem: Risk for Impaired Skin Integrity  Goal: Tissue integrity - skin and mucous membranes  Outcome: Ongoing  Note:   Skin intact. No skin breakdown noted. Patient is ambulatory and able to make frequent turns. Will continue to monitor. Problem: Falls - Risk of:  Goal: Will remain free from falls  Note:   See martinez score. Patient ambulates with steady gait. Non skid slippers on. Instruct patient to call with additional needs. Patient verbalized understanding. Bed in low position and brakes in use. Patient in bed with call light and personal belongings in reach. Will continue to monitor.

## 2019-06-03 NOTE — PROGRESS NOTES
FOLLOW UP CONSULT:      INTERIM HISTORY: Now patient has been extubated. Revisited for oral cavity exam.  No airway concerns. No throat pain. No dysphagia.       PAST MEDICAL HISTORY:   Social History     Tobacco Use   Smoking Status Never Smoker                                                    Social History     Substance and Sexual Activity   Alcohol Use No                                                    Current Facility-Administered Medications:     insulin lispro (HUMALOG) injection pen 0-18 Units, 0-18 Units, Subcutaneous, TID WC, Glo Alfredo MD, 12 Units at 06/02/19 1747    insulin lispro (HUMALOG) injection pen 0-9 Units, 0-9 Units, Subcutaneous, Nightly, Glo Alfredo MD, 6 Units at 06/02/19 2107    glucose (GLUTOSE) 40 % oral gel 15 g, 15 g, Oral, PRN, Raz Leo MD    dextrose 50 % solution 12.5 g, 12.5 g, Intravenous, PRN, Raz Leo MD    glucagon (rDNA) injection 1 mg, 1 mg, Intramuscular, PRN, Raz Leo MD    dextrose 5 % solution, 100 mL/hr, Intravenous, PRN, Raz Leo MD    lidocaine PF 1 % injection 5 mL, 5 mL, Intradermal, Once, Raz Leo MD    sodium chloride flush 0.9 % injection 10 mL, 10 mL, Intravenous, 2 times per day, Raz Leo MD, 10 mL at 06/02/19 1959    sodium chloride flush 0.9 % injection 10 mL, 10 mL, Intravenous, PRN, Raz Leo MD, 10 mL at 05/31/19 2048    valsartan (DIOVAN) tablet 20 mg, 20 mg, Oral, Daily, Raz Leo MD, 20 mg at 06/01/19 0839    benzocaine-menthol (CEPACOL SORE THROAT) lozenge 1 lozenge, 1 lozenge, Oral, U9G PRN, Raz Leo MD    buPROPion (WELLBUTRIN) tablet 300 mg, 300 mg, Oral, Daily, Raz Leo MD, 701 mg at 06/02/19 0806    omeprazole (PRILOSEC) delayed release capsule 20 mg, 20 mg, Oral, Daily, Raz Leo MD, 20 mg at 06/02/19 0806    PARoxetine (PAXIL) tablet 30 mg, 30 mg, Oral, Daily, Raz Leo MD, 30 mg at 06/02/19 0805    simvastatin (ZOCOR) tablet 10 mg, 10 mg, Oral, Nightly, Raz Leo MD, 10 mg at 06/02/19 1958    linagliptin (TRADJENTA) tablet 5 mg, 5 mg, Oral, Daily, Hernandez Dorsey MD, 5 mg at 06/02/19 0809    magnesium hydroxide (MILK OF MAGNESIA) 400 MG/5ML suspension 30 mL, 30 mL, Oral, Daily PRN, Kathryn Gutierrez MD    ondansetron (ZOFRAN) injection 4 mg, 4 mg, Intravenous, Q6H PRN, Kathryn Gutierrez MD    enoxaparin (LOVENOX) injection 40 mg, 40 mg, Subcutaneous, Daily, Kathryn Gutierrez MD, 40 mg at 06/02/19 0806    acetaminophen (TYLENOL) suppository 650 mg, 650 mg, Rectal, Q4H PRN, Kathryn Gutierrez MD    ampicillin-sulbactam (UNASYN) 3 g ivpb minibag, 3 g, Intravenous, Q6H, Kathryn Gutierrez MD, Last Rate: 200 mL/hr at 06/03/19 0613, 3 g at 06/03/19 0613    glucose (GLUTOSE) 40 % oral gel 15 g, 15 g, Oral, PRN, Rodrigo Crenshaw MD    dextrose 50 % solution 12.5 g, 12.5 g, Intravenous, PRN, Rodrigo Crenshaw MD    glucagon (rDNA) injection 1 mg, 1 mg, Intramuscular, PRN, Rodrigo Crenshaw MD    dextrose 5 % solution, 100 mL/hr, Intravenous, PRN, Rodrigo Crenshaw MD    methylPREDNISolone sodium (SOLU-MEDROL) injection 80 mg, 80 mg, Intravenous, Daily, Rodrigo Crenshaw MD, 80 mg at 06/02/19 0805                                                 Past Medical History:   Diagnosis Date    Arthritis     Diabetes ([facility] [address]     High blood pressure                                                     Past Surgical History:   Procedure Laterality Date    KNEE SURGERY      left    LEG SURGERY      TUBAL LIGATION           FAMILY HISTORY:  Reviewed. No significant family history. REVIEW OF SYSTEMS: All pertinent positive and negative review of systems included in HPI. Otherwise, all other systems are reviewed and are negative    PHYSICAL EXAMINATION:   GENERAL: wdwn- no acute distress  COMMUNICATION :  Normal voice  MENTAL STATUS:  Normal  HEAD AND FACE:  Normal  EXTERNAL EARS AND NOSE:  Normal  FACIAL MUSCLES:  Normal  LIPS, TEETH, GINGIVA:  Normal mucosa. No edema. PHARYNX:  No edema of tongue or oral cavity.   Exam of pharynx is normal. Palpation of tongue and tonsil fossa reveals no induration. There is a plaque of the oral tongue and floor of mouth anteriorly which is soft and does not impede mobilty of tongue. NECK:  No masses or adenopathy  SALIVARY GLANDS:  Normal  THYROID:  Normal    IMPRESSION: Benign lesions of oral tongue and floor of mouth. 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 I have given patient and will be available for follow up.

## 2019-06-04 LAB
BLOOD CULTURE, ROUTINE: NORMAL
CULTURE, BLOOD 2: NORMAL

## 2019-06-04 NOTE — DISCHARGE SUMMARY
Hospital Medicine Discharge Summary    Patient ID: Alexa Torres   Gender: female  : 1955   Age: 58 y.o. 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 Hospital Course: 57 yo F with PMHx NIDDM, HTN, and R MCA saccular aneurysm who was experiencing maxillary facial pain in March which was later attributed to tooth abscess which was treated with root canal of L cuspid tooth by a dentist in [facility] in April.   On day of admission she experienced sudden-onset of R-sided tongue swelling, followed by an acute episode of vomiting.   She drove herself to [facility]  where CT soft tissue neck identified right submandibular periglandular fluid with extension into the submental and sublingual compartments, consistent with Jluis's angina. She was started on Unasyn and steroids, intubated for airway protection and transferred to [facility] ICU for ENT evaluation. She was [id number]  without complications.   ENT was consulted and it was decided there was no need for surgical intervention patient was treated with ABx and Steroids Infectious Disease was consulted for antibiotic selection and duration. She was continued on Unasyn and will continue with therapy on Invanz as an outpatient with Home health care. Patient was placed on a steroid taper to finish at home.  Patient will need to continue ABx therapy until the June 12th. PICC nurse was consulted and a midline was placed without complication. SW helped patient and family to establish care with a home health agency that will assist with antibiotic administration at home. At this time patient is stable and ready for discahrge    Disposition:  Home with Home Health Care    Physical Exam Performed:     BP (!) 159/78   Pulse 76   Temp 98.5 °F (36.9 °C) (Oral)   Resp 18   Ht 5' 4\" (1.626 m)   Wt 177 lb 11.1 oz (80.6 kg)   SpO2 90%   BMI 30.50 kg/m²       General appearance:  No apparent distress, appears stated age and cooperative. HEENT:  Normal cephalic, atraumatic without obvious deformity. Pupils equal, round, and reactive to light. Extra ocular muscles intact. Conjunctivae/corneas clear. Small white plaques bilateral underside of tongue   Neck: Supple, with full range of motion. No jugular venous distention. Trachea midline. Respiratory:  Normal respiratory effort. Clear to auscultation, bilaterally without Rales/Wheezes/Rhonchi. Cardiovascular:  Regular rate and rhythm with normal S1/S2 without murmurs, rubs or gallops. Abdomen: Soft, non-tender, non-distended with normal bowel sounds. Musculoskeletal:  No clubbing, cyanosis or edema bilaterally. Full range of motion without deformity. Skin: Skin color, texture, turgor normal.  No rashes or lesions. Neurologic:  Neurovascularly intact without any focal sensory/motor deficits. Psychiatric:  Alert and oriented, thought content appropriate, normal insight  Capillary Refill: Brisk,< 3 seconds   Peripheral Pulses: +2 palpable, equal bilaterally       Labs:  For convenience and continuity at follow-up the following most recent labs are provided:      CBC:    Lab Results   Component Value Date    WBC 3.1 06/03/2019    HGB 10.0 06/03/2019    HCT 30.9 06/03/2019    PLT 85 06/03/2019       Renal:    Lab Results   Component Value Date     06/03/2019    K 4.0 06/03/2019     06/03/2019    CO2 25 06/03/2019    BUN 20 06/03/2019    CREATININE 0.8 06/03/2019    CALCIUM 9.2 06/03/2019    PHOS 2.8 05/31/2019         Significant Diagnostic Studies    Radiology:   No orders to display          Consults:     IP CONSULT TO CRITICAL CARE  IP CONSULT TO INFECTIOUS DISEASES  IP CONSULT TO GENERAL SURGERY  IP CONSULT TO HOME CARE NEEDS    Disposition:  Home with Home health care     Condition at Discharge: Stable    Discharge Instructions/Follow-up:     Follow up with with your PCP to discuss hospital course and changes to medications. Follow up with your dentist to ensure completion of dental procedure and to ensure infection resolution. Code Status:  Full Code    Activity: activity as tolerated    Diet: diabetic diet      Discharge Medications:     Discharge Medication List as of 6/3/2019  7:58 PM           Details   predniSONE (DELTASONE) 20 MG tablet Take 3 tablets by mouth daily for 2 days, THEN 2 tablets daily for 2 days, THEN 1 tablet daily for 2 days. Take 4 tablets by mouth once daily for 5 days. , Disp-12 tablet, R-0Print              Details   omeprazole (PRILOSEC) 20 MG delayed release capsule Take 20 mg by mouth dailyHistorical Med      IRON PO Take 65 mg by mouthHistorical Med      Cholecalciferol (VITAMIN D PO) Take 1.25 mg by mouthHistorical Med      Omega-3 Fatty Acids (FISH OIL) 435 MG CAPS Take by mouthHistorical Med      SITagliptin Phosphate (JANUVIA PO) Take 50 mg by mouthHistorical Med      buPROPion HCl (WELLBUTRIN PO) Take 300 mg by mouthHistorical Med      PARoxetine (PAXIL) 20 MG tablet Historical Med      simvastatin (ZOCOR) 20 MG tablet Historical Med      losartan-hydrochlorothiazide (HYZAAR) 100-25 MG per tablet Historical Med      metformin (GLUCOPHAGE) 500 MG tablet 1,000 mg Historical Med             Time Spent on discharge is more than 30 minutes in the examination, evaluation, counseling and review of medications and discharge plan.       Signed:    Sadie Ireland DO Cathryn   6/3/2019      Thank you [address], APRN - CNP for the opportunity to be involved in this patient's care. If you have any questions or concerns please feel free to contact me.

## 2019-06-10 LAB
ALBUMIN SERPL-MCNC: 4.1 G/DL
ALP BLD-CCNC: 72 U/L
ALT SERPL-CCNC: 24 U/L
ANION GAP SERPL CALCULATED.3IONS-SCNC: 14 MMOL/L
AST SERPL-CCNC: 13 U/L
BASOPHILS ABSOLUTE: 0 /ΜL
BASOPHILS RELATIVE PERCENT: ABNORMAL %
BILIRUB SERPL-MCNC: 0.3 MG/DL (ref 0.1–1.4)
BUN BLDV-MCNC: 23 MG/DL
C-REACTIVE PROTEIN: 2.97
CALCIUM SERPL-MCNC: 9.8 MG/DL
CHLORIDE BLD-SCNC: 101 MMOL/L
CO2: 26 MMOL/L
CREAT SERPL-MCNC: 0.97 MG/DL
EOSINOPHILS ABSOLUTE: 0.1 /ΜL
EOSINOPHILS RELATIVE PERCENT: ABNORMAL %
GFR CALCULATED: NORMAL
GLUCOSE BLD-MCNC: 129 MG/DL
HCT VFR BLD CALC: 39.6 % (ref 36–46)
HEMOGLOBIN: 11.6 G/DL (ref 12–16)
LYMPHOCYTES ABSOLUTE: 1 /ΜL
LYMPHOCYTES RELATIVE PERCENT: ABNORMAL %
MCH RBC QN AUTO: ABNORMAL PG
MCHC RBC AUTO-ENTMCNC: ABNORMAL G/DL
MCV RBC AUTO: ABNORMAL FL
MONOCYTES ABSOLUTE: 0.3 /ΜL
MONOCYTES RELATIVE PERCENT: ABNORMAL %
NEUTROPHILS ABSOLUTE: 4.1 /ΜL
NEUTROPHILS RELATIVE PERCENT: ABNORMAL %
PLATELET # BLD: 105 K/ΜL
PMV BLD AUTO: ABNORMAL FL
POTASSIUM SERPL-SCNC: 5.1 MMOL/L
RBC # BLD: 4.43 10^6/ΜL
SEDIMENTATION RATE, ERYTHROCYTE: 87
SODIUM BLD-SCNC: 141 MMOL/L
TOTAL PROTEIN: 7.2
WBC # BLD: 5.5 10^3/ML